# Patient Record
Sex: FEMALE | Race: OTHER | NOT HISPANIC OR LATINO | URBAN - METROPOLITAN AREA
[De-identification: names, ages, dates, MRNs, and addresses within clinical notes are randomized per-mention and may not be internally consistent; named-entity substitution may affect disease eponyms.]

---

## 2024-01-10 PROBLEM — Z00.00 ENCOUNTER FOR PREVENTIVE HEALTH EXAMINATION: Status: ACTIVE | Noted: 2024-01-10

## 2024-01-11 ENCOUNTER — OUTPATIENT (OUTPATIENT)
Dept: OUTPATIENT SERVICES | Facility: HOSPITAL | Age: 29
LOS: 1 days | End: 2024-01-11
Payer: MEDICAID

## 2024-01-11 DIAGNOSIS — R13.19 OTHER DYSPHAGIA: ICD-10-CM

## 2024-01-11 DIAGNOSIS — K21.9 GASTRO-ESOPHAGEAL REFLUX DISEASE WITHOUT ESOPHAGITIS: ICD-10-CM

## 2024-01-11 PROCEDURE — 74220 X-RAY XM ESOPHAGUS 1CNTRST: CPT | Mod: 26

## 2024-01-11 PROCEDURE — 74220 X-RAY XM ESOPHAGUS 1CNTRST: CPT

## 2024-01-23 ENCOUNTER — APPOINTMENT (OUTPATIENT)
Dept: RADIOLOGY | Facility: HOSPITAL | Age: 29
End: 2024-01-23

## 2024-05-15 ENCOUNTER — ASOB RESULT (OUTPATIENT)
Age: 29
End: 2024-05-15

## 2024-05-15 ENCOUNTER — APPOINTMENT (OUTPATIENT)
Dept: OBGYN | Facility: CLINIC | Age: 29
End: 2024-05-15
Payer: MEDICAID

## 2024-05-15 VITALS
DIASTOLIC BLOOD PRESSURE: 104 MMHG | WEIGHT: 168 LBS | SYSTOLIC BLOOD PRESSURE: 157 MMHG | BODY MASS INDEX: 26.37 KG/M2 | HEIGHT: 67 IN | HEART RATE: 71 BPM

## 2024-05-15 VITALS — SYSTOLIC BLOOD PRESSURE: 154 MMHG | HEART RATE: 33 BPM | DIASTOLIC BLOOD PRESSURE: 98 MMHG

## 2024-05-15 DIAGNOSIS — R14.0 ABDOMINAL DISTENSION (GASEOUS): ICD-10-CM

## 2024-05-15 DIAGNOSIS — O10.911 UNSPECIFIED PRE-EXISTING HYPERTENSION COMPLICATING PREGNANCY, FIRST TRIMESTER: ICD-10-CM

## 2024-05-15 DIAGNOSIS — B37.31 ACUTE CANDIDIASIS OF VULVA AND VAGINA: ICD-10-CM

## 2024-05-15 DIAGNOSIS — Z34.91 ENCOUNTER FOR SUPERVISION OF NORMAL PREGNANCY, UNSPECIFIED, FIRST TRIMESTER: ICD-10-CM

## 2024-05-15 DIAGNOSIS — R10.9 ABDOMINAL DISTENSION (GASEOUS): ICD-10-CM

## 2024-05-15 PROCEDURE — 99204 OFFICE O/P NEW MOD 45 MIN: CPT

## 2024-05-15 PROCEDURE — 76817 TRANSVAGINAL US OBSTETRIC: CPT

## 2024-05-15 RX ORDER — TERCONAZOLE 8 MG/G
0.8 CREAM VAGINAL
Qty: 1 | Refills: 0 | Status: ACTIVE | COMMUNITY
Start: 2024-05-15 | End: 1900-01-01

## 2024-05-15 RX ORDER — ASPIRIN ENTERIC COATED TABLETS 81 MG 81 MG/1
81 TABLET, DELAYED RELEASE ORAL DAILY
Qty: 30 | Refills: 3 | Status: ACTIVE | COMMUNITY
Start: 2024-05-15 | End: 1900-01-01

## 2024-05-20 LAB
ALBUMIN SERPL ELPH-MCNC: 4.3 G/DL
ALP BLD-CCNC: 64 U/L
ALT SERPL-CCNC: 12 U/L
ANION GAP SERPL CALC-SCNC: 16 MMOL/L
AST SERPL-CCNC: 17 U/L
B19V IGG SER QL IA: 3.45 INDEX
B19V IGG+IGM SER-IMP: NORMAL
B19V IGG+IGM SER-IMP: POSITIVE
B19V IGM FLD-ACNC: 0.15 INDEX
B19V IGM SER-ACNC: NEGATIVE
BACTERIA UR CULT: NORMAL
BILIRUB SERPL-MCNC: 0.2 MG/DL
BUN SERPL-MCNC: 8 MG/DL
C TRACH RRNA SPEC QL NAA+PROBE: NOT DETECTED
CALCIUM SERPL-MCNC: 9.3 MG/DL
CHLORIDE SERPL-SCNC: 99 MMOL/L
CMV IGG SERPL QL: 2.1 U/ML
CMV IGG SERPL-IMP: POSITIVE
CMV IGM SERPL QL: <8 AU/ML
CMV IGM SERPL QL: NEGATIVE
CO2 SERPL-SCNC: 20 MMOL/L
CREAT SERPL-MCNC: 0.57 MG/DL
CREAT SPEC-SCNC: 50 MG/DL
CREAT/PROT UR: 0.1 RATIO
EGFR: 127 ML/MIN/1.73M2
ESTIMATED AVERAGE GLUCOSE: 97 MG/DL
GLUCOSE SERPL-MCNC: 44 MG/DL
HBA1C MFR BLD HPLC: 5 %
LDH SERPL-CCNC: 206 U/L
M TB IFN-G BLD-IMP: NEGATIVE
N GONORRHOEA RRNA SPEC QL NAA+PROBE: NOT DETECTED
POTASSIUM SERPL-SCNC: 4.7 MMOL/L
PROT SERPL-MCNC: 7.3 G/DL
PROT UR-MCNC: 4 MG/DL
QUANTIFERON TB PLUS MITOGEN MINUS NIL: 5.59 IU/ML
QUANTIFERON TB PLUS NIL: 0.04 IU/ML
QUANTIFERON TB PLUS TB1 MINUS NIL: 0 IU/ML
QUANTIFERON TB PLUS TB2 MINUS NIL: 0 IU/ML
SODIUM SERPL-SCNC: 135 MMOL/L
SOURCE AMPLIFICATION: NORMAL
T GONDII AB SER-IMP: NEGATIVE
T GONDII AB SER-IMP: POSITIVE
T GONDII IGG SER QL: 26.2 IU/ML
T GONDII IGM SER QL: <3 AU/ML
TSH SERPL-ACNC: 0.96 UIU/ML
URATE SERPL-MCNC: 3.6 MG/DL

## 2024-05-23 LAB
CREAT 24H UR-MCNC: 1.3 G/24 H
CREAT ?TM UR-MCNC: 55 MG/DL
PROT 24H UR-MRATE: 16 MG/DL
PROT ?TM UR-MCNC: 24 HR
PROT UR-MCNC: 384 MG/24 H
SPECIMEN VOL 24H UR: 2400 ML

## 2024-05-29 ENCOUNTER — ASOB RESULT (OUTPATIENT)
Age: 29
End: 2024-05-29

## 2024-05-29 ENCOUNTER — APPOINTMENT (OUTPATIENT)
Dept: OBGYN | Facility: CLINIC | Age: 29
End: 2024-05-29
Payer: MEDICAID

## 2024-05-29 ENCOUNTER — APPOINTMENT (OUTPATIENT)
Dept: ANTEPARTUM | Facility: CLINIC | Age: 29
End: 2024-05-29
Payer: MEDICAID

## 2024-05-29 VITALS
DIASTOLIC BLOOD PRESSURE: 79 MMHG | SYSTOLIC BLOOD PRESSURE: 127 MMHG | HEART RATE: 73 BPM | WEIGHT: 163 LBS | BODY MASS INDEX: 25.58 KG/M2 | HEIGHT: 67 IN

## 2024-05-29 DIAGNOSIS — O21.9 VOMITING OF PREGNANCY, UNSPECIFIED: ICD-10-CM

## 2024-05-29 PROCEDURE — 76801 OB US < 14 WKS SINGLE FETUS: CPT | Mod: 59

## 2024-05-29 PROCEDURE — 99203 OFFICE O/P NEW LOW 30 MIN: CPT | Mod: TH

## 2024-05-29 PROCEDURE — 76813 OB US NUCHAL MEAS 1 GEST: CPT

## 2024-05-29 RX ORDER — DOXYLAMINE SUCCINATE AND PYRIDOXINE HYDROCHLORIDE 10; 10 MG/1; MG/1
10-10 TABLET, DELAYED RELEASE ORAL
Qty: 30 | Refills: 2 | Status: ACTIVE | COMMUNITY
Start: 2024-05-29 | End: 1900-01-01

## 2024-06-10 ENCOUNTER — EMERGENCY (EMERGENCY)
Facility: HOSPITAL | Age: 29
LOS: 1 days | Discharge: HOME | End: 2024-06-10
Attending: EMERGENCY MEDICINE | Admitting: EMERGENCY MEDICINE
Payer: MEDICAID

## 2024-06-10 VITALS
SYSTOLIC BLOOD PRESSURE: 136 MMHG | TEMPERATURE: 98 F | HEART RATE: 87 BPM | DIASTOLIC BLOOD PRESSURE: 87 MMHG | WEIGHT: 162.92 LBS | RESPIRATION RATE: 18 BRPM | OXYGEN SATURATION: 100 %

## 2024-06-10 VITALS
TEMPERATURE: 98 F | OXYGEN SATURATION: 100 % | HEART RATE: 77 BPM | RESPIRATION RATE: 18 BRPM | DIASTOLIC BLOOD PRESSURE: 86 MMHG | SYSTOLIC BLOOD PRESSURE: 125 MMHG

## 2024-06-10 LAB
ALBUMIN SERPL ELPH-MCNC: 4.1 G/DL — SIGNIFICANT CHANGE UP (ref 3.3–5)
ALP SERPL-CCNC: 59 U/L — SIGNIFICANT CHANGE UP (ref 40–120)
ALT FLD-CCNC: 9 U/L — SIGNIFICANT CHANGE UP (ref 4–33)
ANION GAP SERPL CALC-SCNC: 12 MMOL/L — SIGNIFICANT CHANGE UP (ref 7–14)
APPEARANCE UR: CLEAR — SIGNIFICANT CHANGE UP
AST SERPL-CCNC: 14 U/L — SIGNIFICANT CHANGE UP (ref 4–32)
BASOPHILS # BLD AUTO: 0.06 K/UL — SIGNIFICANT CHANGE UP (ref 0–0.2)
BASOPHILS NFR BLD AUTO: 0.6 % — SIGNIFICANT CHANGE UP (ref 0–2)
BILIRUB SERPL-MCNC: <0.2 MG/DL — SIGNIFICANT CHANGE UP (ref 0.2–1.2)
BILIRUB UR-MCNC: NEGATIVE — SIGNIFICANT CHANGE UP
BLD GP AB SCN SERPL QL: NEGATIVE — SIGNIFICANT CHANGE UP
BUN SERPL-MCNC: 8 MG/DL — SIGNIFICANT CHANGE UP (ref 7–23)
CALCIUM SERPL-MCNC: 9.7 MG/DL — SIGNIFICANT CHANGE UP (ref 8.4–10.5)
CHLORIDE SERPL-SCNC: 103 MMOL/L — SIGNIFICANT CHANGE UP (ref 98–107)
CO2 SERPL-SCNC: 21 MMOL/L — LOW (ref 22–31)
COLOR SPEC: YELLOW — SIGNIFICANT CHANGE UP
CREAT SERPL-MCNC: 0.51 MG/DL — SIGNIFICANT CHANGE UP (ref 0.5–1.3)
DIFF PNL FLD: NEGATIVE — SIGNIFICANT CHANGE UP
EGFR: 130 ML/MIN/1.73M2 — SIGNIFICANT CHANGE UP
EOSINOPHIL # BLD AUTO: 0.08 K/UL — SIGNIFICANT CHANGE UP (ref 0–0.5)
EOSINOPHIL NFR BLD AUTO: 0.8 % — SIGNIFICANT CHANGE UP (ref 0–6)
GLUCOSE SERPL-MCNC: 85 MG/DL — SIGNIFICANT CHANGE UP (ref 70–99)
GLUCOSE UR QL: NEGATIVE MG/DL — SIGNIFICANT CHANGE UP
HCG SERPL-ACNC: SIGNIFICANT CHANGE UP MIU/ML
HCT VFR BLD CALC: 36.7 % — SIGNIFICANT CHANGE UP (ref 34.5–45)
HGB BLD-MCNC: 12.4 G/DL — SIGNIFICANT CHANGE UP (ref 11.5–15.5)
IANC: 7.23 K/UL — SIGNIFICANT CHANGE UP (ref 1.8–7.4)
IMM GRANULOCYTES NFR BLD AUTO: 0.3 % — SIGNIFICANT CHANGE UP (ref 0–0.9)
KETONES UR-MCNC: NEGATIVE MG/DL — SIGNIFICANT CHANGE UP
LEUKOCYTE ESTERASE UR-ACNC: NEGATIVE — SIGNIFICANT CHANGE UP
LYMPHOCYTES # BLD AUTO: 1.64 K/UL — SIGNIFICANT CHANGE UP (ref 1–3.3)
LYMPHOCYTES # BLD AUTO: 17.1 % — SIGNIFICANT CHANGE UP (ref 13–44)
MCHC RBC-ENTMCNC: 28.1 PG — SIGNIFICANT CHANGE UP (ref 27–34)
MCHC RBC-ENTMCNC: 33.8 GM/DL — SIGNIFICANT CHANGE UP (ref 32–36)
MCV RBC AUTO: 83.2 FL — SIGNIFICANT CHANGE UP (ref 80–100)
MONOCYTES # BLD AUTO: 0.53 K/UL — SIGNIFICANT CHANGE UP (ref 0–0.9)
MONOCYTES NFR BLD AUTO: 5.5 % — SIGNIFICANT CHANGE UP (ref 2–14)
NEUTROPHILS # BLD AUTO: 7.23 K/UL — SIGNIFICANT CHANGE UP (ref 1.8–7.4)
NEUTROPHILS NFR BLD AUTO: 75.7 % — SIGNIFICANT CHANGE UP (ref 43–77)
NITRITE UR-MCNC: NEGATIVE — SIGNIFICANT CHANGE UP
NRBC # BLD: 0 /100 WBCS — SIGNIFICANT CHANGE UP (ref 0–0)
NRBC # FLD: 0 K/UL — SIGNIFICANT CHANGE UP (ref 0–0)
PH UR: 7 — SIGNIFICANT CHANGE UP (ref 5–8)
PLATELET # BLD AUTO: 233 K/UL — SIGNIFICANT CHANGE UP (ref 150–400)
POTASSIUM SERPL-MCNC: 4 MMOL/L — SIGNIFICANT CHANGE UP (ref 3.5–5.3)
POTASSIUM SERPL-SCNC: 4 MMOL/L — SIGNIFICANT CHANGE UP (ref 3.5–5.3)
PROT SERPL-MCNC: 7.5 G/DL — SIGNIFICANT CHANGE UP (ref 6–8.3)
PROT UR-MCNC: NEGATIVE MG/DL — SIGNIFICANT CHANGE UP
RBC # BLD: 4.41 M/UL — SIGNIFICANT CHANGE UP (ref 3.8–5.2)
RBC # FLD: 14.6 % — HIGH (ref 10.3–14.5)
RH IG SCN BLD-IMP: NEGATIVE — SIGNIFICANT CHANGE UP
SODIUM SERPL-SCNC: 136 MMOL/L — SIGNIFICANT CHANGE UP (ref 135–145)
SP GR SPEC: 1.01 — SIGNIFICANT CHANGE UP (ref 1–1.03)
UROBILINOGEN FLD QL: 0.2 MG/DL — SIGNIFICANT CHANGE UP (ref 0.2–1)
WBC # BLD: 9.57 K/UL — SIGNIFICANT CHANGE UP (ref 3.8–10.5)
WBC # FLD AUTO: 9.57 K/UL — SIGNIFICANT CHANGE UP (ref 3.8–10.5)

## 2024-06-10 PROCEDURE — 99284 EMERGENCY DEPT VISIT MOD MDM: CPT

## 2024-06-10 PROCEDURE — 76801 OB US < 14 WKS SINGLE FETUS: CPT | Mod: 26

## 2024-06-10 NOTE — ED ADULT NURSE NOTE - OBJECTIVE STATEMENT
30 y/o F AAOx4, ambulatory at baseline pregnant with her second child c/o vaginal spotting and pelvic pain x2 days. Pt denies vaginal bleeding. Endorsing lower back pain. Breathing even and unlabored. No pallor or diachoresis noted at this time. Labs drawn and sent as per MD order.

## 2024-06-10 NOTE — ED PROVIDER NOTE - CLINICAL SUMMARY MEDICAL DECISION MAKING FREE TEXT BOX
To the emergency department with concern for threatened miscarriage.+ IUP negative pregnancy heart rate 160 UA negative Rh+ previous labs has will follow-up with PMD. To the emergency department with concern for threatened miscarriage.+ IUP negative pregnancy heart rate 170 UA negative Rh+ previous labs has will follow-up with PMD.

## 2024-06-10 NOTE — ED PROVIDER NOTE - OBJECTIVE STATEMENT
29-year-old female G2, P0 presents emergency department with vaginal bleeding cramping nausea vomit diarrhea lightheadedness dizziness states bleeding is mild small amount dried blood mild cramping of the left adnexa.

## 2024-06-10 NOTE — ED PROVIDER NOTE - NSFOLLOWUPINSTRUCTIONS_ED_ALL_ED_FT
Threatened Miscarriage  A threatened miscarriage occurs when a woman has vaginal bleeding during the first 20 weeks of pregnancy but the pregnancy has not ended. If vaginal bleeding occurs during this time, the health care provider will do tests to make sure the woman is still pregnant. The woman's condition may be considered a threatened miscarriage if the tests show:  That she is still pregnant.  That the embryo or unborn baby (fetus) inside the uterus is still growing.  A threatened miscarriage does not mean your pregnancy will end, but it does increase the risk of losing your pregnancy (miscarriage).    What are the causes?  The cause of this condition is usually not known.    What increases the risk?  The following factors may make a pregnant woman more likely to have a miscarriage:    Certain medical conditions    Conditions that affect the hormone balance in the body, such as thyroid disease or polycystic ovary syndrome.  Diabetes.  Autoimmune disorders.  Infections.  Bleeding disorders.  Obesity.  Lifestyle factors    Using products with tobacco or nicotine or being exposed to tobacco smoke.  Having alcohol.  Having large amounts of caffeine.  Recreational drug use.  Problems with reproductive organs or structures    Cervical insufficiency. This is when the the lowest part of the uterus (cervix) opens and thins before pregnancy is at term.  Having a condition called Asherman syndrome, which causes scarring in the uterus or causes the uterus to be abnormal in structure.  Fibrous growths, called fibroids, in the uterus.  Congenital abnormalities. These problems are present at birth.  Infection of the cervix or uterus.  Personal or medical history    Injury (trauma).  Having had a miscarriage before.  Being younger than age 18 or older than age 35.  Exposure to harmful substances in the environment. This may include radiation or heavy metals, such as lead.  Using certain medicines.  What are the signs or symptoms?  Symptoms of this condition include:  Vaginal bleeding or spotting, with or without cramps or pain.  Mild pain or cramps in your abdomen.  How is this diagnosed?    You may have tests to check whether you are still pregnant. These tests will be done if you have bleeding, with or without pain, in your abdomen before the 20th week of pregnancy. These tests include:  Ultrasound.  A physical exam.  Measurement of your baby's heart rate.  Lab tests, such as blood tests, urine tests, or swabs for infection.  You may be diagnosed with a threatened miscarriage if:  Ultrasound testing shows that you are still pregnant.  Your baby's heart rate is strong.  A physical exam shows that your cervix is closed.  Blood tests confirm that you are still pregnant.  How is this treated?  No treatments have been shown to prevent a threatened miscarriage from going on to a complete miscarriage. However, the right home care is important.    Follow these instructions at home:  Get plenty of rest.  Do not have sex, douche, or put anything in your vagina, such as tampons, until your health care provider says it is okay.  Do not smoke or use recreational drugs.  Do not drink alcohol.  Avoid caffeine.  Keep all follow-up prenatal visits. This is important.  Contact a health care provider if:  You have light vaginal bleeding or spotting while pregnant.  You have pain or cramping in your abdomen.  You have a fever.  Get help right away if:  Heavy bleeding soaks through 2 large sanitary pads an hour for more than 2 hours.  Blood clots come out of your vagina.  Tissue comes out of your vagina.  You leak fluid, or you have a gush of fluid from your vagina.  You have severe low back pain or cramps in your abdomen.  You have a fever, chills, and severe pain in the abdomen.  Summary  A threatened miscarriage occurs when a woman bleeds from the vagina during the first 20 weeks of pregnancy but the pregnancy has not ended.  The cause of a threatened miscarriage is usually not known.  Symptoms of this condition may include vaginal bleeding and mild pain or cramps in your abdomen.  No treatments have been shown to prevent a threatened miscarriage from going on to a complete miscarriage.  Keep all follow-up prenatal visits. This is important.

## 2024-06-10 NOTE — ED PROVIDER NOTE - PATIENT PORTAL LINK FT
You can access the FollowMyHealth Patient Portal offered by [facility] by registering at the following website: [email or web address]. By joining [facility]’s FollowMyHealth portal, you will also be able to view your health information using other applications (apps) compatible with our system.

## 2024-07-01 ENCOUNTER — ASOB RESULT (OUTPATIENT)
Age: 29
End: 2024-07-01

## 2024-07-01 ENCOUNTER — APPOINTMENT (OUTPATIENT)
Dept: OBGYN | Facility: CLINIC | Age: 29
End: 2024-07-01
Payer: MEDICAID

## 2024-07-01 DIAGNOSIS — N39.0 URINARY TRACT INFECTION, SITE NOT SPECIFIED: ICD-10-CM

## 2024-07-01 DIAGNOSIS — O36.8120 DECREASED FETAL MOVEMENTS, SECOND TRIMESTER, NOT APPLICABLE OR UNSPECIFIED: ICD-10-CM

## 2024-07-01 DIAGNOSIS — Z34.92 ENCOUNTER FOR SUPERVISION OF NORMAL PREGNANCY, UNSPECIFIED, SECOND TRIMESTER: ICD-10-CM

## 2024-07-01 PROCEDURE — 99213 OFFICE O/P EST LOW 20 MIN: CPT | Mod: TH,25,95

## 2024-07-01 PROCEDURE — 76817 TRANSVAGINAL US OBSTETRIC: CPT

## 2024-07-02 PROBLEM — N39.0 ACUTE UTI: Status: ACTIVE | Noted: 2024-07-02 | Resolved: 2024-08-01

## 2024-07-10 ENCOUNTER — APPOINTMENT (OUTPATIENT)
Dept: OBGYN | Facility: CLINIC | Age: 29
End: 2024-07-10
Payer: MEDICAID

## 2024-07-10 VITALS
WEIGHT: 168 LBS | BODY MASS INDEX: 26.37 KG/M2 | DIASTOLIC BLOOD PRESSURE: 80 MMHG | HEART RATE: 81 BPM | SYSTOLIC BLOOD PRESSURE: 127 MMHG | HEIGHT: 67 IN

## 2024-07-10 DIAGNOSIS — O26.899 OTHER SPECIFIED PREGNANCY RELATED CONDITIONS, UNSPECIFIED TRIMESTER: ICD-10-CM

## 2024-07-10 DIAGNOSIS — R06.02 OTHER SPECIFIED PREGNANCY RELATED CONDITIONS, UNSPECIFIED TRIMESTER: ICD-10-CM

## 2024-07-10 PROCEDURE — 99213 OFFICE O/P EST LOW 20 MIN: CPT | Mod: TH

## 2024-07-11 LAB
AF-AFP MOM: 1.09
AFP CONCENTRATION: 39.92 NG/ML
AFP INTERPRETATION: NORMAL
AFP MOM CUT-OFF: 2.5
AFP PERCENTILE: 60.1
AFP SCREENING RESULT: NORMAL
AFTER SCREENING RISK OPEN SPINA BIFIDA: NORMAL
BEFORE SCREENING RISK OPEN SPINA BIFIDA: NORMAL
EXTREME ANALYTE ALERT: NO
GESTATIONAL  AGE: NORMAL
RACE/ETHNICITY: NORMAL

## 2024-07-25 ENCOUNTER — APPOINTMENT (OUTPATIENT)
Dept: ANTEPARTUM | Facility: CLINIC | Age: 29
End: 2024-07-25
Payer: MEDICAID

## 2024-07-25 ENCOUNTER — ASOB RESULT (OUTPATIENT)
Age: 29
End: 2024-07-25

## 2024-07-25 PROCEDURE — 76805 OB US >/= 14 WKS SNGL FETUS: CPT

## 2024-07-31 ENCOUNTER — APPOINTMENT (OUTPATIENT)
Dept: OBGYN | Facility: CLINIC | Age: 29
End: 2024-07-31

## 2024-07-31 ENCOUNTER — NON-APPOINTMENT (OUTPATIENT)
Age: 29
End: 2024-07-31

## 2024-08-09 ENCOUNTER — APPOINTMENT (OUTPATIENT)
Dept: OBGYN | Facility: CLINIC | Age: 29
End: 2024-08-09

## 2024-08-09 ENCOUNTER — NON-APPOINTMENT (OUTPATIENT)
Age: 29
End: 2024-08-09

## 2024-08-09 PROCEDURE — 99213 OFFICE O/P EST LOW 20 MIN: CPT | Mod: TH

## 2024-09-04 RX ORDER — PSYLLIUM HUSK 0.4 G
CAPSULE ORAL
Refills: 0 | Status: ACTIVE | COMMUNITY

## 2024-09-04 RX ORDER — PRENATAL VIT 49/IRON FUM/FOLIC 6.75-0.2MG
TABLET ORAL
Refills: 0 | Status: ACTIVE | COMMUNITY

## 2024-09-05 ENCOUNTER — NON-APPOINTMENT (OUTPATIENT)
Age: 29
End: 2024-09-05

## 2024-09-05 ENCOUNTER — APPOINTMENT (OUTPATIENT)
Dept: OBGYN | Facility: CLINIC | Age: 29
End: 2024-09-05
Payer: MEDICAID

## 2024-09-05 VITALS
HEART RATE: 72 BPM | BODY MASS INDEX: 28.09 KG/M2 | DIASTOLIC BLOOD PRESSURE: 82 MMHG | HEIGHT: 67 IN | SYSTOLIC BLOOD PRESSURE: 131 MMHG | WEIGHT: 179 LBS

## 2024-09-05 PROCEDURE — 99213 OFFICE O/P EST LOW 20 MIN: CPT | Mod: TH,25

## 2024-09-05 PROCEDURE — 99459 PELVIC EXAMINATION: CPT

## 2024-09-06 LAB
GLUCOSE 1H P 50 G GLC PO SERPL-MCNC: 91 MG/DL
HCT VFR BLD CALC: 34.7 %
HGB BLD-MCNC: 10.7 G/DL
MCHC RBC-ENTMCNC: 27.1 PG
MCHC RBC-ENTMCNC: 30.8 GM/DL
MCV RBC AUTO: 87.8 FL
PLATELET # BLD AUTO: 227 K/UL
RBC # BLD: 3.95 M/UL
RBC # FLD: 15.3 %
WBC # FLD AUTO: 12.24 K/UL

## 2024-09-09 ENCOUNTER — NON-APPOINTMENT (OUTPATIENT)
Age: 29
End: 2024-09-09

## 2024-09-09 ENCOUNTER — APPOINTMENT (OUTPATIENT)
Dept: CARDIOLOGY | Facility: CLINIC | Age: 29
End: 2024-09-09
Payer: MEDICAID

## 2024-09-09 VITALS
RESPIRATION RATE: 16 BRPM | WEIGHT: 176 LBS | OXYGEN SATURATION: 100 % | HEART RATE: 82 BPM | BODY MASS INDEX: 27.62 KG/M2 | HEIGHT: 67 IN | SYSTOLIC BLOOD PRESSURE: 110 MMHG | DIASTOLIC BLOOD PRESSURE: 74 MMHG | TEMPERATURE: 98.3 F

## 2024-09-09 DIAGNOSIS — R07.89 OTHER CHEST PAIN: ICD-10-CM

## 2024-09-09 DIAGNOSIS — O26.899 OTHER SPECIFIED PREGNANCY RELATED CONDITIONS, UNSPECIFIED TRIMESTER: ICD-10-CM

## 2024-09-09 DIAGNOSIS — R06.00 DYSPNEA, UNSPECIFIED: ICD-10-CM

## 2024-09-09 DIAGNOSIS — R06.02 OTHER SPECIFIED PREGNANCY RELATED CONDITIONS, UNSPECIFIED TRIMESTER: ICD-10-CM

## 2024-09-09 DIAGNOSIS — M79.669 PAIN IN UNSPECIFIED LOWER LEG: ICD-10-CM

## 2024-09-09 DIAGNOSIS — Z91.89 OTHER SPECIFIED PERSONAL RISK FACTORS, NOT ELSEWHERE CLASSIFIED: ICD-10-CM

## 2024-09-09 PROCEDURE — G2211 COMPLEX E/M VISIT ADD ON: CPT | Mod: NC

## 2024-09-09 PROCEDURE — 93000 ELECTROCARDIOGRAM COMPLETE: CPT

## 2024-09-09 PROCEDURE — 99204 OFFICE O/P NEW MOD 45 MIN: CPT | Mod: 25

## 2024-09-09 NOTE — REVIEW OF SYSTEMS
[SOB] : shortness of breath [Dyspnea on exertion] : dyspnea during exertion [Chest Discomfort] : chest discomfort [Lower Ext Edema] : lower extremity edema [Negative] : Psychiatric
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
## 2024-09-10 NOTE — PHYSICAL EXAM
[Clear Lung Fields] : clear lung fields [Normal] : alert and oriented, normal memory [de-identified] : fatigue [de-identified] : II/IV LUANA [de-identified] : SOBE

## 2024-09-10 NOTE — PHYSICAL EXAM
[Clear Lung Fields] : clear lung fields [Normal] : alert and oriented, normal memory [de-identified] : fatigue [de-identified] : II/IV LUANA [de-identified] : SOBE

## 2024-09-10 NOTE — PHYSICAL EXAM
[Clear Lung Fields] : clear lung fields [Normal] : alert and oriented, normal memory [de-identified] : fatigue [de-identified] : II/IV LUANA [de-identified] : SOBE
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
## 2024-09-13 LAB — NT-PROBNP SERPL-MCNC: <36 PG/ML

## 2024-09-18 ENCOUNTER — APPOINTMENT (OUTPATIENT)
Dept: ANTEPARTUM | Facility: CLINIC | Age: 29
End: 2024-09-18
Payer: MEDICAID

## 2024-09-18 ENCOUNTER — APPOINTMENT (OUTPATIENT)
Dept: OBGYN | Facility: CLINIC | Age: 29
End: 2024-09-18
Payer: MEDICAID

## 2024-09-18 ENCOUNTER — ASOB RESULT (OUTPATIENT)
Age: 29
End: 2024-09-18

## 2024-09-18 VITALS
HEART RATE: 76 BPM | WEIGHT: 180 LBS | DIASTOLIC BLOOD PRESSURE: 78 MMHG | SYSTOLIC BLOOD PRESSURE: 121 MMHG | BODY MASS INDEX: 28.25 KG/M2 | HEIGHT: 67 IN

## 2024-09-18 DIAGNOSIS — R12 HEARTBURN: ICD-10-CM

## 2024-09-18 DIAGNOSIS — O99.012 ANEMIA COMPLICATING PREGNANCY, SECOND TRIMESTER: ICD-10-CM

## 2024-09-18 PROCEDURE — 99213 OFFICE O/P EST LOW 20 MIN: CPT | Mod: TH,25

## 2024-09-18 PROCEDURE — 76816 OB US FOLLOW-UP PER FETUS: CPT

## 2024-09-18 PROCEDURE — 76819 FETAL BIOPHYS PROFIL W/O NST: CPT | Mod: 59

## 2024-09-18 RX ORDER — CHLORHEXIDINE GLUCONATE 4 %
325 (65 FE) LIQUID (ML) TOPICAL
Qty: 30 | Refills: 3 | Status: ACTIVE | COMMUNITY
Start: 2024-09-18 | End: 1900-01-01

## 2024-09-18 RX ORDER — FAMOTIDINE 40 MG/1
40 TABLET, FILM COATED ORAL
Qty: 30 | Refills: 1 | Status: ACTIVE | COMMUNITY
Start: 2024-09-18 | End: 1900-01-01

## 2024-09-19 ENCOUNTER — APPOINTMENT (OUTPATIENT)
Dept: ANTEPARTUM | Facility: CLINIC | Age: 29
End: 2024-09-19

## 2024-09-25 ENCOUNTER — APPOINTMENT (OUTPATIENT)
Dept: OBGYN | Facility: CLINIC | Age: 29
End: 2024-09-25

## 2024-09-26 ENCOUNTER — APPOINTMENT (OUTPATIENT)
Dept: ANTEPARTUM | Facility: CLINIC | Age: 29
End: 2024-09-26

## 2024-10-07 ENCOUNTER — APPOINTMENT (OUTPATIENT)
Dept: ANTEPARTUM | Facility: CLINIC | Age: 29
End: 2024-10-07

## 2024-10-07 ENCOUNTER — OUTPATIENT (OUTPATIENT)
Dept: INPATIENT UNIT | Facility: HOSPITAL | Age: 29
LOS: 1 days | Discharge: HOME | End: 2024-10-07
Payer: MEDICAID

## 2024-10-07 VITALS — DIASTOLIC BLOOD PRESSURE: 67 MMHG | HEART RATE: 81 BPM | SYSTOLIC BLOOD PRESSURE: 104 MMHG

## 2024-10-07 VITALS
HEART RATE: 88 BPM | DIASTOLIC BLOOD PRESSURE: 82 MMHG | TEMPERATURE: 99 F | RESPIRATION RATE: 16 BRPM | SYSTOLIC BLOOD PRESSURE: 146 MMHG

## 2024-10-07 DIAGNOSIS — Z98.890 OTHER SPECIFIED POSTPROCEDURAL STATES: Chronic | ICD-10-CM

## 2024-10-07 DIAGNOSIS — O26.899 OTHER SPECIFIED PREGNANCY RELATED CONDITIONS, UNSPECIFIED TRIMESTER: ICD-10-CM

## 2024-10-07 LAB
APPEARANCE UR: ABNORMAL
BACTERIA # UR AUTO: ABNORMAL /HPF
BILIRUB UR-MCNC: NEGATIVE — SIGNIFICANT CHANGE UP
CAST: 1 /LPF — SIGNIFICANT CHANGE UP (ref 0–4)
COLOR SPEC: YELLOW — SIGNIFICANT CHANGE UP
DIFF PNL FLD: NEGATIVE — SIGNIFICANT CHANGE UP
GLUCOSE UR QL: NEGATIVE MG/DL — SIGNIFICANT CHANGE UP
KETONES UR-MCNC: NEGATIVE MG/DL — SIGNIFICANT CHANGE UP
LEUKOCYTE ESTERASE UR-ACNC: NEGATIVE — SIGNIFICANT CHANGE UP
NITRITE UR-MCNC: NEGATIVE — SIGNIFICANT CHANGE UP
PH UR: 6.5 — SIGNIFICANT CHANGE UP (ref 5–8)
PROT UR-MCNC: SIGNIFICANT CHANGE UP MG/DL
RBC CASTS # UR COMP ASSIST: 0 /HPF — SIGNIFICANT CHANGE UP (ref 0–4)
SP GR SPEC: 1.02 — SIGNIFICANT CHANGE UP (ref 1–1.03)
SQUAMOUS # UR AUTO: 8 /HPF — HIGH (ref 0–5)
UROBILINOGEN FLD QL: 1 MG/DL — SIGNIFICANT CHANGE UP (ref 0.2–1)
WBC UR QL: 2 /HPF — SIGNIFICANT CHANGE UP (ref 0–5)

## 2024-10-07 PROCEDURE — 76817 TRANSVAGINAL US OBSTETRIC: CPT | Mod: 26

## 2024-10-07 PROCEDURE — 76815 OB US LIMITED FETUS(S): CPT | Mod: 26

## 2024-10-07 PROCEDURE — 76819 FETAL BIOPHYS PROFIL W/O NST: CPT | Mod: 26

## 2024-10-07 PROCEDURE — 99221 1ST HOSP IP/OBS SF/LOW 40: CPT

## 2024-10-07 RX ORDER — ASPIRIN 325 MG
1 TABLET ORAL
Refills: 0 | DISCHARGE

## 2024-10-07 RX ORDER — PRENATAL VIT,CAL 76/IRON/FOLIC 29 MG-1 MG
1 TABLET ORAL
Refills: 0 | DISCHARGE

## 2024-10-07 NOTE — OB PROVIDER TRIAGE NOTE - HISTORY OF PRESENT ILLNESS
30 yo , EGA@30 4/7 weeks, presented to D&T with pelvic pain that started since saturday pain 6/10 pt denies taking any medication,  Pt also c/o decrease fetal movement since this AM, pt report that she has not feel the baby move as she usually do. denies vaginal bleeding, leakage of fluid.  Denies constipation, urinary symptoms, sexual activities for the past 2 -2 days.    Prenatal care with Dr. Plasencia  Prenatal course is uncomplicated.       GBS status is negative.    OB hx: primigravida  Med hx:  Surg hx:  GYN hx: denies hx of abnormal papsmear/cysts/fibroids/STDs  Meds:  Allergies:    Social hx: Denies alcohol, tobacco, drug use  Psych hx: denies hx of anxiety/depression; lives with spouse    PHYSICAL EXAM  Vital Signs:        Gen: NAD  Head: NC/AT  Cardio: S1S2+, RRR  Resp: CTABL, no wheezing  Abdomen: Soft, NT/ND, BS+  Extremities: No LE edema bilaterally    NST and BPP done to assess fetal surveillance and results as follows:  NST-->FHR: 135 HR baseline, moderate variability, accelerations present, no decelerations, reactive NST.  [place]: Contractions present, irregular,  saved in ASOB- TAUS   SSE :No pooling noted, nitrazine negative, FERN positive. No active lesions noted internally, externally, on perineum or rectum.  SVE: 0.5/long/-3 28 yo , EGA@30 4/7 weeks, presented to D&T with pelvic pain that started since Saturday pain 6/10 pt denies taking any medication,  Pt also c/o decrease fetal movement since this AM, pt report that she has not feel the baby move as she usually do. Denies vaginal bleeding, leakage of fluid.  Denies constipation, urinary symptoms, sexual activities for the past 2 -2 days.    Prenatal care with Dr. Plasencia  Prenatal course is uncomplicated.   Meds: ASA, PNV  Allergies: NKDA

## 2024-10-07 NOTE — OB RN TRIAGE NOTE - FALL HARM RISK - UNIVERSAL INTERVENTIONS
Bed in lowest position, wheels locked, appropriate side rails in place/Call bell, personal items and telephone in reach/Instruct patient to call for assistance before getting out of bed or chair/Non-slip footwear when patient is out of bed/[place] to call system/Physically safe environment - no spills, clutter or unnecessary equipment/Purposeful Proactive Rounding/Room/bathroom lighting operational, light cord in reach

## 2024-10-07 NOTE — OB PROVIDER TRIAGE NOTE - NSHPLABSRESULTS_GEN_ALL_CORE
Urinalysis Basic - ( 07 Oct 2024 20:35 )    Color: Yellow / Appearance: Cloudy / S.024 / pH: x  Gluc: x / Ketone: Negative mg/dL  / Bili: Negative / Urobili: 1.0 mg/dL   Blood: x / Protein: Trace mg/dL / Nitrite: Negative   Leuk Esterase: Negative / RBC: 0 /HPF / WBC 2 /HPF   Sq Epi: x / Non Sq Epi: 8 /HPF / Bacteria: Few /HPF      plan   urine culture sent

## 2024-10-07 NOTE — OB PROVIDER TRIAGE NOTE - NS_BABIESUTERO_OBGYN_ALL_OB_NU
1 Graft Donor Site Bandage (Optional-Leave Blank If You Don't Want In Note): Steri-strips and a pressure bandage were applied to the donor site.

## 2024-10-07 NOTE — OB PROVIDER TRIAGE NOTE - NS_SONONOTE_OBGYN_ALL_OB_FT
polychndritis is still mildly active in view of elevated ESR. To continue follow up with rheumatologist and take MTX as ordered.   M-mode 148bpm, BPP 8/8

## 2024-10-07 NOTE — OB PROVIDER TRIAGE NOTE - NSOBPROVIDERNOTE_OBGYN_ALL_OB_FT
28 yo , EGA@30 4/7 weeks, R/O  labor, decrease fetal movement  UA/ urine culture   Pt report that she has been feeling movement since here in triage, report feeling movement and sees movement while performing SONO.   2300 Discuss with Dr. Carolyn guzmán for discharge  Pt to keep self very well hydrated  Discharge patient home.  Labor precautions and fetal movements count were reviewed; if not in labor, will follow up with PMD with for the next schedule appointment on Wednesday.  All ordered tests results reviewed and interpreted.  Plan of care was reviewed with patient and family; patient states understanding of the above plan.  Pt Discharged home @ 2310PM

## 2024-10-07 NOTE — OB PROVIDER TRIAGE NOTE - NSHPPHYSICALEXAM_GEN_ALL_CORE
Vital Signs Last 24 Hrs  T(C): 37.1 (07 Oct 2024 20:24), Max: 37.1 (07 Oct 2024 18:39)  T(F): 98.78 (07 Oct 2024 20:24), Max: 98.8 (07 Oct 2024 18:39)  HR: 81 (07 Oct 2024 22:25) (69 - 88)  BP: 104/67 (07 Oct 2024 22:25) (104/67 - 146/82)  BP(mean): --  RR: 16 (07 Oct 2024 20:24) (16 - 16)  SpO2: --    Gen: NAD  Head: NC/AT  Cardio: S1S2+, RRR  Resp: CTABL, no wheezing  Abdomen: Soft, NT/ND, BS+  Extremities: No LE edema bilaterally    NST--FHR: 130 HR baseline, moderate variability, accelerations present, no decelerations, Category 1.  [place]: no Contractions present  Saved in ASOB- TAUS: cephalic presentation, posterior placenta, JULIO 9.35cm, m-mode 148bpm, BPP 8/8  SSE: scant amount of leukorrhea, cervix appear close. no bleeding noted, FFN collected and held  Saved in ASOB- TVUS: cervical length 4.1-4.2cm, no dynamical changes noted.

## 2024-10-09 LAB
CULTURE RESULTS: SIGNIFICANT CHANGE UP
SPECIMEN SOURCE: SIGNIFICANT CHANGE UP

## 2024-10-10 DIAGNOSIS — O36.8130 DECREASED FETAL MOVEMENTS, THIRD TRIMESTER, NOT APPLICABLE OR UNSPECIFIED: ICD-10-CM

## 2024-10-10 DIAGNOSIS — O26.893 OTHER SPECIFIED PREGNANCY RELATED CONDITIONS, THIRD TRIMESTER: ICD-10-CM

## 2024-10-10 DIAGNOSIS — R10.2 PELVIC AND PERINEAL PAIN: ICD-10-CM

## 2024-10-10 DIAGNOSIS — Z3A.30 30 WEEKS GESTATION OF PREGNANCY: ICD-10-CM

## 2024-10-11 ENCOUNTER — NON-APPOINTMENT (OUTPATIENT)
Age: 29
End: 2024-10-11

## 2024-10-11 ENCOUNTER — APPOINTMENT (OUTPATIENT)
Dept: OBGYN | Facility: CLINIC | Age: 29
End: 2024-10-11

## 2024-10-16 ENCOUNTER — APPOINTMENT (OUTPATIENT)
Dept: OBGYN | Facility: CLINIC | Age: 29
End: 2024-10-16

## 2024-10-16 VITALS
WEIGHT: 190 LBS | HEIGHT: 67 IN | HEART RATE: 81 BPM | BODY MASS INDEX: 29.82 KG/M2 | SYSTOLIC BLOOD PRESSURE: 134 MMHG | DIASTOLIC BLOOD PRESSURE: 82 MMHG

## 2024-10-16 PROCEDURE — 99213 OFFICE O/P EST LOW 20 MIN: CPT | Mod: TH,25

## 2024-10-24 ENCOUNTER — APPOINTMENT (OUTPATIENT)
Dept: CV DIAGNOSITCS | Facility: HOSPITAL | Age: 29
End: 2024-10-24

## 2024-10-24 ENCOUNTER — RESULT REVIEW (OUTPATIENT)
Age: 29
End: 2024-10-24

## 2024-10-24 ENCOUNTER — APPOINTMENT (OUTPATIENT)
Dept: CV DIAGNOSTICS | Facility: HOSPITAL | Age: 29
End: 2024-10-24

## 2024-10-30 ENCOUNTER — APPOINTMENT (OUTPATIENT)
Dept: ANTEPARTUM | Facility: CLINIC | Age: 29
End: 2024-10-30
Payer: MEDICAID

## 2024-10-30 ENCOUNTER — APPOINTMENT (OUTPATIENT)
Dept: OBGYN | Facility: CLINIC | Age: 29
End: 2024-10-30

## 2024-10-30 ENCOUNTER — ASOB RESULT (OUTPATIENT)
Age: 29
End: 2024-10-30

## 2024-10-30 VITALS
HEART RATE: 82 BPM | SYSTOLIC BLOOD PRESSURE: 135 MMHG | DIASTOLIC BLOOD PRESSURE: 89 MMHG | HEIGHT: 67 IN | BODY MASS INDEX: 30.61 KG/M2 | WEIGHT: 195 LBS

## 2024-10-30 VITALS — SYSTOLIC BLOOD PRESSURE: 150 MMHG | DIASTOLIC BLOOD PRESSURE: 98 MMHG

## 2024-10-30 DIAGNOSIS — R09.89 OTHER SPECIFIED SYMPTOMS AND SIGNS INVOLVING THE CIRCULATORY AND RESPIRATORY SYSTEMS: ICD-10-CM

## 2024-10-30 PROCEDURE — 76819 FETAL BIOPHYS PROFIL W/O NST: CPT | Mod: 59

## 2024-10-30 PROCEDURE — 99213 OFFICE O/P EST LOW 20 MIN: CPT | Mod: TH

## 2024-10-30 PROCEDURE — 76816 OB US FOLLOW-UP PER FETUS: CPT

## 2024-10-31 LAB
BASOPHILS # BLD AUTO: 0.06 K/UL
BASOPHILS NFR BLD AUTO: 0.5 %
EOSINOPHIL # BLD AUTO: 0.13 K/UL
EOSINOPHIL NFR BLD AUTO: 1.1 %
HCT VFR BLD CALC: 37 %
HGB BLD-MCNC: 11.4 G/DL
IMM GRANULOCYTES NFR BLD AUTO: 0.9 %
LYMPHOCYTES # BLD AUTO: 2.32 K/UL
LYMPHOCYTES NFR BLD AUTO: 19.5 %
MAN DIFF?: NORMAL
MCHC RBC-ENTMCNC: 26 PG
MCHC RBC-ENTMCNC: 30.8 G/DL
MCV RBC AUTO: 84.5 FL
MONOCYTES # BLD AUTO: 0.92 K/UL
MONOCYTES NFR BLD AUTO: 7.8 %
NEUTROPHILS # BLD AUTO: 8.33 K/UL
NEUTROPHILS NFR BLD AUTO: 70.2 %
PLATELET # BLD AUTO: 241 K/UL
RBC # BLD: 4.38 M/UL
RBC # FLD: 14.7 %
WBC # FLD AUTO: 11.87 K/UL

## 2024-11-01 LAB
ALBUMIN SERPL ELPH-MCNC: 3.9 G/DL
ALP BLD-CCNC: 112 U/L
ALT SERPL-CCNC: 13 U/L
ANION GAP SERPL CALC-SCNC: 18 MMOL/L
AST SERPL-CCNC: 17 U/L
BILIRUB SERPL-MCNC: <0.2 MG/DL
BUN SERPL-MCNC: 7 MG/DL
CALCIUM SERPL-MCNC: 9.3 MG/DL
CHLORIDE SERPL-SCNC: 102 MMOL/L
CO2 SERPL-SCNC: 20 MMOL/L
CREAT SERPL-MCNC: 0.6 MG/DL
EGFR: 125 ML/MIN/1.73M2
GLUCOSE SERPL-MCNC: 53 MG/DL
POTASSIUM SERPL-SCNC: 4.5 MMOL/L
PROT SERPL-MCNC: 6.6 G/DL
SODIUM SERPL-SCNC: 140 MMOL/L
URATE SERPL-MCNC: 4.2 MG/DL

## 2024-11-02 ENCOUNTER — ASOB RESULT (OUTPATIENT)
Age: 29
End: 2024-11-02

## 2024-11-02 ENCOUNTER — OUTPATIENT (OUTPATIENT)
Dept: INPATIENT UNIT | Facility: HOSPITAL | Age: 29
LOS: 1 days | Discharge: HOME | End: 2024-11-02
Payer: MEDICAID

## 2024-11-02 ENCOUNTER — APPOINTMENT (OUTPATIENT)
Dept: ANTEPARTUM | Facility: CLINIC | Age: 29
End: 2024-11-02

## 2024-11-02 VITALS
TEMPERATURE: 98 F | SYSTOLIC BLOOD PRESSURE: 115 MMHG | RESPIRATION RATE: 18 BRPM | DIASTOLIC BLOOD PRESSURE: 66 MMHG | HEART RATE: 72 BPM

## 2024-11-02 VITALS
RESPIRATION RATE: 14 BRPM | SYSTOLIC BLOOD PRESSURE: 148 MMHG | TEMPERATURE: 98 F | HEART RATE: 69 BPM | DIASTOLIC BLOOD PRESSURE: 92 MMHG

## 2024-11-02 DIAGNOSIS — Z98.890 OTHER SPECIFIED POSTPROCEDURAL STATES: Chronic | ICD-10-CM

## 2024-11-02 DIAGNOSIS — O26.899 OTHER SPECIFIED PREGNANCY RELATED CONDITIONS, UNSPECIFIED TRIMESTER: ICD-10-CM

## 2024-11-02 LAB
ALBUMIN SERPL ELPH-MCNC: 3.5 G/DL — SIGNIFICANT CHANGE UP (ref 3.3–5)
ALP SERPL-CCNC: 100 U/L — SIGNIFICANT CHANGE UP (ref 40–120)
ALT FLD-CCNC: 13 U/L — SIGNIFICANT CHANGE UP (ref 4–33)
ANION GAP SERPL CALC-SCNC: 12 MMOL/L — SIGNIFICANT CHANGE UP (ref 7–14)
APPEARANCE UR: CLEAR — SIGNIFICANT CHANGE UP
AST SERPL-CCNC: 14 U/L — SIGNIFICANT CHANGE UP (ref 4–32)
BASOPHILS # BLD AUTO: 0.07 K/UL — SIGNIFICANT CHANGE UP (ref 0–0.2)
BASOPHILS NFR BLD AUTO: 0.7 % — SIGNIFICANT CHANGE UP (ref 0–2)
BILIRUB SERPL-MCNC: <0.2 MG/DL — SIGNIFICANT CHANGE UP (ref 0.2–1.2)
BILIRUB UR-MCNC: NEGATIVE — SIGNIFICANT CHANGE UP
BUN SERPL-MCNC: 9 MG/DL — SIGNIFICANT CHANGE UP (ref 7–23)
CALCIUM SERPL-MCNC: 9.1 MG/DL — SIGNIFICANT CHANGE UP (ref 8.4–10.5)
CHLORIDE SERPL-SCNC: 104 MMOL/L — SIGNIFICANT CHANGE UP (ref 98–107)
CO2 SERPL-SCNC: 23 MMOL/L — SIGNIFICANT CHANGE UP (ref 22–31)
COLOR SPEC: YELLOW — SIGNIFICANT CHANGE UP
CREAT ?TM UR-MCNC: 123 MG/DL — SIGNIFICANT CHANGE UP
CREAT SERPL-MCNC: 0.62 MG/DL — SIGNIFICANT CHANGE UP (ref 0.5–1.3)
DIFF PNL FLD: NEGATIVE — SIGNIFICANT CHANGE UP
EGFR: 124 ML/MIN/1.73M2 — SIGNIFICANT CHANGE UP
EGFR: 124 ML/MIN/1.73M2 — SIGNIFICANT CHANGE UP
EOSINOPHIL # BLD AUTO: 0.15 K/UL — SIGNIFICANT CHANGE UP (ref 0–0.5)
EOSINOPHIL NFR BLD AUTO: 1.5 % — SIGNIFICANT CHANGE UP (ref 0–6)
GLUCOSE SERPL-MCNC: 88 MG/DL — SIGNIFICANT CHANGE UP (ref 70–99)
GLUCOSE UR QL: NEGATIVE MG/DL — SIGNIFICANT CHANGE UP
HCT VFR BLD CALC: 34.7 % — SIGNIFICANT CHANGE UP (ref 34.5–45)
HGB BLD-MCNC: 10.5 G/DL — LOW (ref 11.5–15.5)
IANC: 6.97 K/UL — SIGNIFICANT CHANGE UP (ref 1.8–7.4)
IMM GRANULOCYTES NFR BLD AUTO: 0.8 % — SIGNIFICANT CHANGE UP (ref 0–0.9)
KETONES UR-MCNC: NEGATIVE MG/DL — SIGNIFICANT CHANGE UP
LDH SERPL L TO P-CCNC: 164 U/L — SIGNIFICANT CHANGE UP (ref 135–225)
LEUKOCYTE ESTERASE UR-ACNC: NEGATIVE — SIGNIFICANT CHANGE UP
LYMPHOCYTES # BLD AUTO: 2.06 K/UL — SIGNIFICANT CHANGE UP (ref 1–3.3)
LYMPHOCYTES # BLD AUTO: 20.1 % — SIGNIFICANT CHANGE UP (ref 13–44)
MCHC RBC-ENTMCNC: 25 PG — LOW (ref 27–34)
MCHC RBC-ENTMCNC: 30.3 G/DL — LOW (ref 32–36)
MCV RBC AUTO: 82.6 FL — SIGNIFICANT CHANGE UP (ref 80–100)
MONOCYTES # BLD AUTO: 0.92 K/UL — HIGH (ref 0–0.9)
MONOCYTES NFR BLD AUTO: 9 % — SIGNIFICANT CHANGE UP (ref 2–14)
NEUTROPHILS # BLD AUTO: 6.97 K/UL — SIGNIFICANT CHANGE UP (ref 1.8–7.4)
NEUTROPHILS NFR BLD AUTO: 67.9 % — SIGNIFICANT CHANGE UP (ref 43–77)
NITRITE UR-MCNC: NEGATIVE — SIGNIFICANT CHANGE UP
NRBC # BLD AUTO: 0 K/UL — SIGNIFICANT CHANGE UP (ref 0–0)
NRBC # BLD: 0 /100 WBCS — SIGNIFICANT CHANGE UP (ref 0–0)
NRBC # FLD: 0 K/UL — SIGNIFICANT CHANGE UP (ref 0–0)
NRBC BLD-RTO: 0 /100 WBCS — SIGNIFICANT CHANGE UP (ref 0–0)
PH UR: 6.5 — SIGNIFICANT CHANGE UP (ref 5–8)
PLATELET # BLD AUTO: 257 K/UL — SIGNIFICANT CHANGE UP (ref 150–400)
POTASSIUM SERPL-MCNC: 4.5 MMOL/L — SIGNIFICANT CHANGE UP (ref 3.5–5.3)
POTASSIUM SERPL-SCNC: 4.5 MMOL/L — SIGNIFICANT CHANGE UP (ref 3.5–5.3)
PROT ?TM UR-MCNC: 14 MG/DL — SIGNIFICANT CHANGE UP
PROT SERPL-MCNC: 6.5 G/DL — SIGNIFICANT CHANGE UP (ref 6–8.3)
PROT UR-MCNC: NEGATIVE MG/DL — SIGNIFICANT CHANGE UP
PROT/CREAT UR-RTO: 0.1 RATIO — SIGNIFICANT CHANGE UP (ref 0–0.2)
RBC # BLD: 4.2 M/UL — SIGNIFICANT CHANGE UP (ref 3.8–5.2)
RBC # FLD: 14.6 % — HIGH (ref 10.3–14.5)
SODIUM SERPL-SCNC: 139 MMOL/L — SIGNIFICANT CHANGE UP (ref 135–145)
SP GR SPEC: 1.02 — SIGNIFICANT CHANGE UP (ref 1–1.03)
URATE SERPL-MCNC: 4.7 MG/DL — SIGNIFICANT CHANGE UP (ref 2.5–7)
UROBILINOGEN FLD QL: 0.2 MG/DL — SIGNIFICANT CHANGE UP (ref 0.2–1)
WBC # BLD: 10.25 K/UL — SIGNIFICANT CHANGE UP (ref 3.8–10.5)
WBC # FLD AUTO: 10.25 K/UL — SIGNIFICANT CHANGE UP (ref 3.8–10.5)

## 2024-11-02 PROCEDURE — 59025 FETAL NON-STRESS TEST: CPT | Mod: 26

## 2024-11-02 PROCEDURE — 76819 FETAL BIOPHYS PROFIL W/O NST: CPT | Mod: 26

## 2024-11-02 PROCEDURE — 99222 1ST HOSP IP/OBS MODERATE 55: CPT | Mod: 25

## 2024-11-02 PROCEDURE — 76815 OB US LIMITED FETUS(S): CPT | Mod: 26

## 2024-11-02 RX ORDER — METOCLOPRAMIDE HCL 10 MG
10 TABLET ORAL ONCE
Refills: 0 | Status: COMPLETED | OUTPATIENT
Start: 2024-11-02 | End: 2024-11-02

## 2024-11-02 RX ORDER — DIPHENHYDRAMINE HCL 12.5MG/5ML
25 ELIXIR ORAL ONCE
Refills: 0 | Status: COMPLETED | OUTPATIENT
Start: 2024-11-02 | End: 2024-11-02

## 2024-11-02 RX ORDER — ACETAMINOPHEN 500 MG/5ML
1000 LIQUID (ML) ORAL ONCE
Refills: 0 | Status: COMPLETED | OUTPATIENT
Start: 2024-11-02 | End: 2024-11-02

## 2024-11-02 RX ADMIN — Medication 1000 MILLIGRAM(S): at 18:36

## 2024-11-02 RX ADMIN — Medication 10 MILLIGRAM(S): at 20:27

## 2024-11-02 RX ADMIN — Medication 25 MILLIGRAM(S): at 20:27

## 2024-11-05 ENCOUNTER — NON-APPOINTMENT (OUTPATIENT)
Age: 29
End: 2024-11-05

## 2024-11-05 ENCOUNTER — APPOINTMENT (OUTPATIENT)
Dept: CARDIOLOGY | Facility: CLINIC | Age: 29
End: 2024-11-05
Payer: MEDICAID

## 2024-11-05 VITALS
HEART RATE: 66 BPM | OXYGEN SATURATION: 100 % | HEIGHT: 67 IN | WEIGHT: 196 LBS | DIASTOLIC BLOOD PRESSURE: 90 MMHG | BODY MASS INDEX: 30.76 KG/M2 | SYSTOLIC BLOOD PRESSURE: 130 MMHG

## 2024-11-05 DIAGNOSIS — I10 ESSENTIAL (PRIMARY) HYPERTENSION: ICD-10-CM

## 2024-11-05 PROBLEM — Z86.79 PERSONAL HISTORY OF OTHER DISEASES OF THE CIRCULATORY SYSTEM: Chronic | Status: ACTIVE | Noted: 2024-11-02

## 2024-11-05 LAB
CREAT 24H UR-MCNC: 1.4 G/24 H
CREAT ?TM UR-MCNC: 51 MG/DL
PROT 24H UR-MRATE: 14 MG/DL
PROT ?TM UR-MCNC: 24 HR
PROT UR-MCNC: 382 MG/24 H
SPECIMEN VOL 24H UR: 2725 ML

## 2024-11-05 PROCEDURE — 93000 ELECTROCARDIOGRAM COMPLETE: CPT

## 2024-11-05 PROCEDURE — 99214 OFFICE O/P EST MOD 30 MIN: CPT | Mod: 25

## 2024-11-05 RX ORDER — NIFEDIPINE 30 MG/1
30 TABLET, EXTENDED RELEASE ORAL DAILY
Qty: 30 | Refills: 0 | Status: ACTIVE | COMMUNITY
Start: 2024-11-05 | End: 1900-01-01

## 2024-11-06 ENCOUNTER — NON-APPOINTMENT (OUTPATIENT)
Age: 29
End: 2024-11-06

## 2024-11-06 ENCOUNTER — APPOINTMENT (OUTPATIENT)
Dept: OBGYN | Facility: CLINIC | Age: 29
End: 2024-11-06
Payer: MEDICAID

## 2024-11-06 VITALS
DIASTOLIC BLOOD PRESSURE: 80 MMHG | WEIGHT: 196 LBS | HEART RATE: 93 BPM | BODY MASS INDEX: 30.76 KG/M2 | SYSTOLIC BLOOD PRESSURE: 128 MMHG | HEIGHT: 67 IN

## 2024-11-06 PROCEDURE — 99213 OFFICE O/P EST LOW 20 MIN: CPT | Mod: TH

## 2024-11-07 DIAGNOSIS — O09.293 SUPERVISION OF PREGNANCY WITH OTHER POOR REPRODUCTIVE OR OBSTETRIC HISTORY, THIRD TRIMESTER: ICD-10-CM

## 2024-11-07 DIAGNOSIS — O10.913 UNSPECIFIED PRE-EXISTING HYPERTENSION COMPLICATING PREGNANCY, THIRD TRIMESTER: ICD-10-CM

## 2024-11-07 DIAGNOSIS — Z3A.34 34 WEEKS GESTATION OF PREGNANCY: ICD-10-CM

## 2024-11-11 ENCOUNTER — APPOINTMENT (OUTPATIENT)
Dept: ANTEPARTUM | Facility: CLINIC | Age: 29
End: 2024-11-11
Payer: MEDICAID

## 2024-11-11 ENCOUNTER — ASOB RESULT (OUTPATIENT)
Age: 29
End: 2024-11-11

## 2024-11-11 PROCEDURE — 76818 FETAL BIOPHYS PROFILE W/NST: CPT

## 2024-11-13 ENCOUNTER — APPOINTMENT (OUTPATIENT)
Dept: OBGYN | Facility: CLINIC | Age: 29
End: 2024-11-13
Payer: MEDICAID

## 2024-11-13 VITALS
HEIGHT: 67 IN | BODY MASS INDEX: 31.71 KG/M2 | HEART RATE: 70 BPM | SYSTOLIC BLOOD PRESSURE: 142 MMHG | WEIGHT: 202 LBS | DIASTOLIC BLOOD PRESSURE: 91 MMHG

## 2024-11-13 VITALS — DIASTOLIC BLOOD PRESSURE: 103 MMHG | SYSTOLIC BLOOD PRESSURE: 148 MMHG

## 2024-11-13 DIAGNOSIS — O10.911 UNSPECIFIED PRE-EXISTING HYPERTENSION COMPLICATING PREGNANCY, FIRST TRIMESTER: ICD-10-CM

## 2024-11-13 PROCEDURE — 99213 OFFICE O/P EST LOW 20 MIN: CPT | Mod: TH

## 2024-11-13 RX ORDER — LABETALOL HYDROCHLORIDE 200 MG/1
200 TABLET, FILM COATED ORAL
Qty: 90 | Refills: 1 | Status: ACTIVE | COMMUNITY
Start: 2024-11-13 | End: 1900-01-01

## 2024-11-14 ENCOUNTER — APPOINTMENT (OUTPATIENT)
Dept: ANTEPARTUM | Facility: CLINIC | Age: 29
End: 2024-11-14
Payer: MEDICAID

## 2024-11-14 ENCOUNTER — ASOB RESULT (OUTPATIENT)
Age: 29
End: 2024-11-14

## 2024-11-14 LAB
ALBUMIN SERPL ELPH-MCNC: 3.8 G/DL
ALP BLD-CCNC: 118 U/L
ALT SERPL-CCNC: 13 U/L
ANION GAP SERPL CALC-SCNC: 13 MMOL/L
APTT BLD: 25.9 SEC
AST SERPL-CCNC: 18 U/L
BILIRUB SERPL-MCNC: 0.2 MG/DL
BUN SERPL-MCNC: 7 MG/DL
CALCIUM SERPL-MCNC: 9.5 MG/DL
CHLORIDE SERPL-SCNC: 102 MMOL/L
CO2 SERPL-SCNC: 23 MMOL/L
CREAT SERPL-MCNC: 0.63 MG/DL
EGFR: 124 ML/MIN/1.73M2
GLUCOSE SERPL-MCNC: 55 MG/DL
HCT VFR BLD CALC: 36.2 %
HGB BLD-MCNC: 11 G/DL
INR PPP: 0.86 RATIO
LDH SERPL-CCNC: 218 U/L
MCHC RBC-ENTMCNC: 25.6 PG
MCHC RBC-ENTMCNC: 30.4 G/DL
MCV RBC AUTO: 84.4 FL
PLATELET # BLD AUTO: 257 K/UL
POTASSIUM SERPL-SCNC: 4.3 MMOL/L
PROT SERPL-MCNC: 6.4 G/DL
PT BLD: 10.2 SEC
RBC # BLD: 4.29 M/UL
RBC # FLD: 15.4 %
SODIUM SERPL-SCNC: 139 MMOL/L
URATE SERPL-MCNC: 4.4 MG/DL
WBC # FLD AUTO: 11.8 K/UL

## 2024-11-14 PROCEDURE — 76818 FETAL BIOPHYS PROFILE W/NST: CPT

## 2024-11-15 ENCOUNTER — APPOINTMENT (OUTPATIENT)
Dept: ANTEPARTUM | Facility: CLINIC | Age: 29
End: 2024-11-15
Payer: MEDICAID

## 2024-11-15 ENCOUNTER — ASOB RESULT (OUTPATIENT)
Age: 29
End: 2024-11-15

## 2024-11-15 ENCOUNTER — OUTPATIENT (OUTPATIENT)
Dept: INPATIENT UNIT | Facility: HOSPITAL | Age: 29
LOS: 1 days | Discharge: HOME | End: 2024-11-15
Payer: MEDICAID

## 2024-11-15 VITALS
HEART RATE: 86 BPM | TEMPERATURE: 98 F | DIASTOLIC BLOOD PRESSURE: 85 MMHG | RESPIRATION RATE: 16 BRPM | SYSTOLIC BLOOD PRESSURE: 132 MMHG

## 2024-11-15 VITALS — HEART RATE: 95 BPM | SYSTOLIC BLOOD PRESSURE: 112 MMHG | DIASTOLIC BLOOD PRESSURE: 66 MMHG

## 2024-11-15 DIAGNOSIS — Z98.890 OTHER SPECIFIED POSTPROCEDURAL STATES: Chronic | ICD-10-CM

## 2024-11-15 DIAGNOSIS — O26.899 OTHER SPECIFIED PREGNANCY RELATED CONDITIONS, UNSPECIFIED TRIMESTER: ICD-10-CM

## 2024-11-15 LAB
GP B STREP DNA SPEC QL NAA+PROBE: NOT DETECTED
SOURCE GBS: NORMAL

## 2024-11-15 PROCEDURE — 76815 OB US LIMITED FETUS(S): CPT | Mod: 26

## 2024-11-15 PROCEDURE — 83986 ASSAY PH BODY FLUID NOS: CPT | Mod: QW

## 2024-11-15 PROCEDURE — 59025 FETAL NON-STRESS TEST: CPT | Mod: 26

## 2024-11-15 PROCEDURE — 76819 FETAL BIOPHYS PROFIL W/O NST: CPT | Mod: 26

## 2024-11-15 PROCEDURE — 99221 1ST HOSP IP/OBS SF/LOW 40: CPT | Mod: 25

## 2024-11-15 NOTE — OB PROVIDER TRIAGE NOTE - NSOBPROVIDERNOTE_OBGYN_ALL_OB_FT
29 y.o  @ 36w1d presenting for r/o PPROM.    -Fetal status reassuring  -No evidence of PPROM at this time  -Patient to be discharged home and will follow up with Dr. Plasencia in office at next scheduled appointment.  -Written and verbal discharge instructions and labor precautions provided to patient.    d/w Dr. Miko BRUNO-BC

## 2024-11-15 NOTE — OB PROVIDER TRIAGE NOTE - HISTORY OF PRESENT ILLNESS
29 y.o.  @ 36w1d presents to hospital with c/o leaking of watery fluid this morning and mucous vaginal discharge. She denies gush of fluid, vaginal bleeding, strong contractions, vision changes, epigastric pain, headache, RUQ pain, SOB, chest pain. Endorses +FM.    Prenatal care with Dr. Erinn CANO: Labetalol 200mg TID (last taken at 12pm)

## 2024-11-15 NOTE — OB PROVIDER TRIAGE NOTE - TERM DELIVERIES, OB PROFILE
Diet, NPO after Midnight:      NPO Start Date: 14-Aug-2024,   NPO Start Time: 23:59 (08-14-24 @ 03:39)  
Fall Risk
0

## 2024-11-15 NOTE — OB RN TRIAGE NOTE - NS_GESTAGE_OBGYN_ALL_OB_FT
Although I was initially assigned to Jerry Baker, I did not see, examine, or participate in this patient's care.    Electronically signed by: Tyrese Carlton PA-C, 11/15/2024 9:54 AM         Tyrese Carlton PA-C  11/15/24 0954     36w1d

## 2024-11-15 NOTE — OB PROVIDER TRIAGE NOTE - NSHPPHYSICALEXAM_GEN_ALL_CORE
T(C): 36.9 (11-15-24 @ 14:31), Max: 36.9 (11-15-24 @ 14:31)  HR: 71 (11-15-24 @ 17:09) (68 - 86)  BP: 124/73 (11-15-24 @ 17:09) (124/73 - 132/85)  RR: 16 (11-15-24 @ 14:31) (16 - 16)    Physical Exam  Gen: NAD  Cardiac: RRR  Pulm: Unlabored, breathing comfortably on room air  Abdomen: soft, nontender, gravid    TAUS: cephalic, posterior placenta, JULIO 9.31, BPP 8/8,  bpm via m-mode, images saved in ASOB  SSE: small amount of white discharge, no pooling, nitrazine negative, fern negative  VE: 1/50/-3  EFM: 130 bpm, moderate variability, +accels, no decels, reactive NST  Flat: no contractions

## 2024-11-15 NOTE — OB PROVIDER TRIAGE NOTE - ADDITIONAL INSTRUCTIONS
Follow up with Dr. Plasencia in office within 1 week. Continue to eat a regular diet and drink plenty of fluid. Return to hospital if you experience cramping, contractions, leaking of vaginal fluid, vaginal bleeding, or a decrease/absence of your baby's movements.

## 2024-11-15 NOTE — OB PROVIDER TRIAGE NOTE - NS_DCDISPOSITION_OBGYN_ALL_OB
CC:  Rashid Kaba Home

## 2024-11-18 ENCOUNTER — APPOINTMENT (OUTPATIENT)
Dept: ANTEPARTUM | Facility: CLINIC | Age: 29
End: 2024-11-18

## 2024-11-18 ENCOUNTER — ASOB RESULT (OUTPATIENT)
Age: 29
End: 2024-11-18

## 2024-11-18 ENCOUNTER — APPOINTMENT (OUTPATIENT)
Dept: ANTEPARTUM | Facility: CLINIC | Age: 29
End: 2024-11-18
Payer: MEDICAID

## 2024-11-18 ENCOUNTER — OUTPATIENT (OUTPATIENT)
Dept: INPATIENT UNIT | Facility: HOSPITAL | Age: 29
LOS: 1 days | Discharge: HOME | End: 2024-11-18
Payer: MEDICAID

## 2024-11-18 VITALS
HEART RATE: 74 BPM | TEMPERATURE: 98 F | DIASTOLIC BLOOD PRESSURE: 95 MMHG | SYSTOLIC BLOOD PRESSURE: 131 MMHG | RESPIRATION RATE: 14 BRPM

## 2024-11-18 VITALS — DIASTOLIC BLOOD PRESSURE: 83 MMHG | HEART RATE: 77 BPM | SYSTOLIC BLOOD PRESSURE: 120 MMHG

## 2024-11-18 DIAGNOSIS — Z98.890 OTHER SPECIFIED POSTPROCEDURAL STATES: Chronic | ICD-10-CM

## 2024-11-18 DIAGNOSIS — Z3A.36 36 WEEKS GESTATION OF PREGNANCY: ICD-10-CM

## 2024-11-18 DIAGNOSIS — O10.013 PRE-EXISTING ESSENTIAL HYPERTENSION COMPLICATING PREGNANCY, THIRD TRIMESTER: ICD-10-CM

## 2024-11-18 DIAGNOSIS — O26.899 OTHER SPECIFIED PREGNANCY RELATED CONDITIONS, UNSPECIFIED TRIMESTER: ICD-10-CM

## 2024-11-18 DIAGNOSIS — Z03.71 ENCOUNTER FOR SUSPECTED PROBLEM WITH AMNIOTIC CAVITY AND MEMBRANE RULED OUT: ICD-10-CM

## 2024-11-18 LAB
ALBUMIN SERPL ELPH-MCNC: 3.6 G/DL — SIGNIFICANT CHANGE UP (ref 3.3–5)
ALP SERPL-CCNC: 120 U/L — SIGNIFICANT CHANGE UP (ref 40–120)
ALT FLD-CCNC: 18 U/L — SIGNIFICANT CHANGE UP (ref 4–33)
ANION GAP SERPL CALC-SCNC: 14 MMOL/L — SIGNIFICANT CHANGE UP (ref 7–14)
APPEARANCE UR: CLEAR — SIGNIFICANT CHANGE UP
AST SERPL-CCNC: 29 U/L — SIGNIFICANT CHANGE UP (ref 4–32)
BASOPHILS # BLD AUTO: 0.07 K/UL — SIGNIFICANT CHANGE UP (ref 0–0.2)
BASOPHILS NFR BLD AUTO: 0.7 % — SIGNIFICANT CHANGE UP (ref 0–2)
BILIRUB SERPL-MCNC: 0.2 MG/DL — SIGNIFICANT CHANGE UP (ref 0.2–1.2)
BILIRUB UR-MCNC: NEGATIVE — SIGNIFICANT CHANGE UP
BUN SERPL-MCNC: 9 MG/DL — SIGNIFICANT CHANGE UP (ref 7–23)
CALCIUM SERPL-MCNC: 9 MG/DL — SIGNIFICANT CHANGE UP (ref 8.4–10.5)
CHLORIDE SERPL-SCNC: 103 MMOL/L — SIGNIFICANT CHANGE UP (ref 98–107)
CO2 SERPL-SCNC: 20 MMOL/L — LOW (ref 22–31)
COLOR SPEC: YELLOW — SIGNIFICANT CHANGE UP
CREAT ?TM UR-MCNC: 191 MG/DL — SIGNIFICANT CHANGE UP
CREAT SERPL-MCNC: 0.63 MG/DL — SIGNIFICANT CHANGE UP (ref 0.5–1.3)
DIFF PNL FLD: NEGATIVE — SIGNIFICANT CHANGE UP
EGFR: 124 ML/MIN/1.73M2 — SIGNIFICANT CHANGE UP
EOSINOPHIL # BLD AUTO: 0.13 K/UL — SIGNIFICANT CHANGE UP (ref 0–0.5)
EOSINOPHIL NFR BLD AUTO: 1.2 % — SIGNIFICANT CHANGE UP (ref 0–6)
GLUCOSE SERPL-MCNC: 88 MG/DL — SIGNIFICANT CHANGE UP (ref 70–99)
GLUCOSE UR QL: NEGATIVE MG/DL — SIGNIFICANT CHANGE UP
HCT VFR BLD CALC: 35.8 % — SIGNIFICANT CHANGE UP (ref 34.5–45)
HGB BLD-MCNC: 11 G/DL — LOW (ref 11.5–15.5)
IANC: 7.8 K/UL — HIGH (ref 1.8–7.4)
IMM GRANULOCYTES NFR BLD AUTO: 0.7 % — SIGNIFICANT CHANGE UP (ref 0–0.9)
KETONES UR-MCNC: NEGATIVE MG/DL — SIGNIFICANT CHANGE UP
LDH SERPL L TO P-CCNC: 265 U/L — HIGH (ref 135–225)
LEUKOCYTE ESTERASE UR-ACNC: NEGATIVE — SIGNIFICANT CHANGE UP
LYMPHOCYTES # BLD AUTO: 1.85 K/UL — SIGNIFICANT CHANGE UP (ref 1–3.3)
LYMPHOCYTES # BLD AUTO: 17.3 % — SIGNIFICANT CHANGE UP (ref 13–44)
MCHC RBC-ENTMCNC: 25.3 PG — LOW (ref 27–34)
MCHC RBC-ENTMCNC: 30.7 G/DL — LOW (ref 32–36)
MCV RBC AUTO: 82.5 FL — SIGNIFICANT CHANGE UP (ref 80–100)
MONOCYTES # BLD AUTO: 0.77 K/UL — SIGNIFICANT CHANGE UP (ref 0–0.9)
MONOCYTES NFR BLD AUTO: 7.2 % — SIGNIFICANT CHANGE UP (ref 2–14)
NEUTROPHILS # BLD AUTO: 7.8 K/UL — HIGH (ref 1.8–7.4)
NEUTROPHILS NFR BLD AUTO: 72.9 % — SIGNIFICANT CHANGE UP (ref 43–77)
NITRITE UR-MCNC: NEGATIVE — SIGNIFICANT CHANGE UP
NRBC # BLD: 0 /100 WBCS — SIGNIFICANT CHANGE UP (ref 0–0)
NRBC # FLD: 0 K/UL — SIGNIFICANT CHANGE UP (ref 0–0)
PH UR: 6 — SIGNIFICANT CHANGE UP (ref 5–8)
PLATELET # BLD AUTO: 257 K/UL — SIGNIFICANT CHANGE UP (ref 150–400)
POTASSIUM SERPL-MCNC: 4.5 MMOL/L — SIGNIFICANT CHANGE UP (ref 3.5–5.3)
POTASSIUM SERPL-SCNC: 4.5 MMOL/L — SIGNIFICANT CHANGE UP (ref 3.5–5.3)
PROT ?TM UR-MCNC: 13 MG/DL — SIGNIFICANT CHANGE UP
PROT SERPL-MCNC: 6.8 G/DL — SIGNIFICANT CHANGE UP (ref 6–8.3)
PROT UR-MCNC: SIGNIFICANT CHANGE UP MG/DL
PROT/CREAT UR-RTO: 0.1 RATIO — SIGNIFICANT CHANGE UP (ref 0–0.2)
RBC # BLD: 4.34 M/UL — SIGNIFICANT CHANGE UP (ref 3.8–5.2)
RBC # FLD: 15.2 % — HIGH (ref 10.3–14.5)
SODIUM SERPL-SCNC: 137 MMOL/L — SIGNIFICANT CHANGE UP (ref 135–145)
SP GR SPEC: 1.03 — SIGNIFICANT CHANGE UP (ref 1–1.03)
URATE SERPL-MCNC: 5.5 MG/DL — SIGNIFICANT CHANGE UP (ref 2.5–7)
UROBILINOGEN FLD QL: 0.2 MG/DL — SIGNIFICANT CHANGE UP (ref 0.2–1)
WBC # BLD: 10.69 K/UL — HIGH (ref 3.8–10.5)
WBC # FLD AUTO: 10.69 K/UL — HIGH (ref 3.8–10.5)

## 2024-11-18 PROCEDURE — 76818 FETAL BIOPHYS PROFILE W/NST: CPT

## 2024-11-18 PROCEDURE — 76819 FETAL BIOPHYS PROFIL W/O NST: CPT | Mod: 26,59

## 2024-11-18 PROCEDURE — 99221 1ST HOSP IP/OBS SF/LOW 40: CPT | Mod: 25

## 2024-11-18 PROCEDURE — 59025 FETAL NON-STRESS TEST: CPT | Mod: 26

## 2024-11-18 RX ORDER — SODIUM CHLORIDE 9 MG/ML
3 INJECTION, SOLUTION INTRAMUSCULAR; INTRAVENOUS; SUBCUTANEOUS EVERY 8 HOURS
Refills: 0 | Status: DISCONTINUED | OUTPATIENT
Start: 2024-11-18 | End: 2024-12-02

## 2024-11-18 RX ORDER — LABETALOL 100 MG/1
200 TABLET, FILM COATED ORAL ONCE
Refills: 0 | Status: COMPLETED | OUTPATIENT
Start: 2024-11-18 | End: 2024-11-18

## 2024-11-18 RX ADMIN — LABETALOL 200 MILLIGRAM(S): 100 TABLET, FILM COATED ORAL at 10:31

## 2024-11-18 NOTE — OB PROVIDER TRIAGE NOTE - ADDITIONAL INSTRUCTIONS
PTL precautions d/w pt  increase fluid intake  instructed on fetal kickcounts   follow up with MFKASHIF as sched on 11/21/2024  follow up with dr Plasencia as sched this week   w/v discharge instructions given  discharged home     discharge@ [id number]

## 2024-11-18 NOTE — OB RN TRIAGE NOTE - FALL HARM RISK - UNIVERSAL INTERVENTIONS
Bed in lowest position, wheels locked, appropriate side rails in place/Call bell, personal items and telephone in reach/Instruct patient to call for assistance before getting out of bed or chair/Non-slip footwear when patient is out of bed/[place] to call system/Physically safe environment - no spills, clutter or unnecessary equipment/Purposeful Proactive Rounding/Room/bathroom lighting operational, light cord in reach

## 2024-11-18 NOTE — OB PROVIDER TRIAGE NOTE - NSOBPROVIDERNOTE_OBGYN_ALL_OB_FT
30 y/o  @ 36.4 wks gestation for prolonged monitoring / repeat BPP / PEC labs : 30 y/o  @ 36.4 wks gestation for prolonged monitoring / repeat BPP / PEC labs :  labetalol 200 mg @ 1031  PEC labs - WNL  will continue to monitor 30 y/o  @ 36.4 wks gestation for prolonged monitoring / repeat BPP / PEC labs :  labetalol 200 mg @ 1031  PEC labs - WNL  will continue to monitor    1335  plan of care d/w dr Plasencia   EFM tracing reviewed by DR Plasencia   no evidence of PEC   maternal and fetal status reassuring   pt to be discharged home as per dr Plasencia  discharge home   PTL precautions d/w pt  increase fluid intake  instructed on fetal kickcounts   follow up with MFM as sched on 2024  follow up with dr Plasencia as sched this week   w/v discharge instructions given  discharged home     discharge@ [id number]

## 2024-11-18 NOTE — OB PROVIDER TRIAGE NOTE - NSHPPHYSICALEXAM_GEN_ALL_CORE
abdomen: soft, nt on palp  NST in progress  T(C): 36.9 (11-18-24 @ 09:48), Max: 36.9 (11-18-24 @ 09:48)  HR: 76 (11-18-24 @ 11:15) (74 - 89)  BP: 124/73 (11-18-24 @ 11:15) (124/73 - 141/96)  RR: 14 (11-18-24 @ 09:48) (14 - 14)  SpO2: -- abdomen: soft, nt on palp  NST in progress  T(C): 36.9 (11-18-24 @ 09:48), Max: 36.9 (11-18-24 @ 09:48)  HR: 76 (11-18-24 @ 11:15) (74 - 89)  BP: 124/73 (11-18-24 @ 11:15) (124/73 - 141/96)  RR: 14 (11-18-24 @ 09:48) (14 - 14)  SpO2: --    sono done by Jacqui KAPADIA   BPP; 8/8 vtx posterior JULIO: 10.3   NST reactive abdomen: soft, nt on palp  NST in progress  T(C): 36.9 (11-18-24 @ 09:48), Max: 36.9 (11-18-24 @ 09:48)  HR: 76 (11-18-24 @ 11:15) (74 - 89)  BP: 124/73 (11-18-24 @ 11:15) (124/73 - 141/96)  RR: 14 (11-18-24 @ 09:48) (14 - 14)  SpO2: --    sono done by Jacqui KAPADIA   BPP; 8/8 vtx posterior JULIO: 10.3   NST reactive      T(C): 36.9 (11-18-24 @ 09:48), Max: 36.9 (11-18-24 @ 09:48)  HR: 77 (11-18-24 @ 11:45) (74 - 89)  BP: 120/83 (11-18-24 @ 11:45) (118/73 - 141/96)  RR: 14 (11-18-24 @ 09:48) (14 - 14)  SpO2: -- abdomen: soft, nt on palp  NST in progress  T(C): 36.9 (11-18-24 @ 09:48), Max: 36.9 (11-18-24 @ 09:48)  HR: 76 (11-18-24 @ 11:15) (74 - 89)  BP: 124/73 (11-18-24 @ 11:15) (124/73 - 141/96)  RR: 14 (11-18-24 @ 09:48) (14 - 14)  SpO2: --    sono done by Jacqui KAPADIA   BPP; 8/8 vtx posterior JULIO: 10.3   NST reactive  FH baseline 140 FH variability mod +FH accels no FH decels  toco: no uterine contractions noted      T(C): 36.9 (11-18-24 @ 09:48), Max: 36.9 (11-18-24 @ 09:48)  HR: 77 (11-18-24 @ 11:45) (74 - 89)  BP: 120/83 (11-18-24 @ 11:45) (118/73 - 141/96)  RR: 14 (11-18-24 @ 09:48) (14 - 14)  SpO2: --

## 2024-11-18 NOTE — OB PROVIDER TRIAGE NOTE - HISTORY OF PRESENT ILLNESS
PNC with DR Rosario   30 y/o  @ 36.4 wks gestation presents from [facility] for prolonged monitoring secondary to NST with variables and her BPP was 6/8 , no breathing was noted pt also reported to Dr Mooney she had RUQ pain x's 2 hours last night  and denies any RUQ pain @ this time denies any headache or visual disturbances pt states last took her labetalol 200 mg po TID last night @  denies any uc's vb or lof reports +FM ap care comp by :   cHTN dx ed age 25 followed  by Dr Sanchez , cardiology   Labetalol 200 mg po TID last dose @    baby ASA qd

## 2024-11-18 NOTE — OB PROVIDER TRIAGE NOTE - NSHPLABSRESULTS_GEN_ALL_CORE
PEC labs PEC labs  CBC Full  -  ( 2024 10:22 )  WBC Count : 10.69 K/uL  RBC Count : 4.34 M/uL  Hemoglobin : 11.0 g/dL  Hematocrit : 35.8 %  Platelet Count - Automated : 257 K/uL  Mean Cell Volume : 82.5 fL  Mean Cell Hemoglobin : 25.3 pg  Mean Cell Hemoglobin Concentration : 30.7 g/dL  Auto Neutrophil # : 7.80 K/uL  Auto Lymphocyte # : 1.85 K/uL  Auto Monocyte # : 0.77 K/uL  Auto Eosinophil # : 0.13 K/uL  Auto Basophil # : 0.07 K/uL  Auto Neutrophil % : 72.9 %  Auto Lymphocyte % : 17.3 %  Auto Monocyte % : 7.2 %  Auto Eosinophil % : 1.2 %  Auto Basophil % : 0.7 %        137  |  103  |  9   ----------------------------<  88  4.5   |  20[L]  |  0.63    Ca    9.0      2024 10:22    TPro  6.8  /  Alb  3.6  /  TBili  0.2  /  DBili  x   /  AST  29  /  ALT  18  /  AlkPhos  120  11-18    uric acid: 5.5   LDH: 265    Urinalysis Basic - ( 2024 10:22 )    Color: Yellow / Appearance: Clear / S.026 / pH: x  Gluc: 88 mg/dL / Ketone: Negative mg/dL  / Bili: Negative / Urobili: 0.2 mg/dL   Blood: x / Protein: Trace mg/dL / Nitrite: Negative   Leuk Esterase: Negative / RBC: x / WBC x   Sq Epi: x / Non Sq Epi: x / Bacteria: x    PCR: 0.1

## 2024-11-19 LAB
CREAT 24H UR-MCNC: 1.2 G/24 H
CREAT ?TM UR-MCNC: 48 MG/DL
PROT 24H UR-MRATE: 4 MG/DL
PROT ?TM UR-MCNC: 24 HR
PROT UR-MCNC: 100 MG/24 H
SPECIMEN VOL 24H UR: 2500 ML

## 2024-11-20 ENCOUNTER — APPOINTMENT (OUTPATIENT)
Dept: OBGYN | Facility: CLINIC | Age: 29
End: 2024-11-20

## 2024-11-20 VITALS
HEART RATE: 73 BPM | WEIGHT: 203 LBS | SYSTOLIC BLOOD PRESSURE: 137 MMHG | BODY MASS INDEX: 31.86 KG/M2 | DIASTOLIC BLOOD PRESSURE: 89 MMHG | HEIGHT: 67 IN

## 2024-11-20 VITALS — HEART RATE: 76 BPM | DIASTOLIC BLOOD PRESSURE: 70 MMHG | SYSTOLIC BLOOD PRESSURE: 119 MMHG

## 2024-11-20 DIAGNOSIS — O09.293 SUPERVISION OF PREGNANCY WITH OTHER POOR REPRODUCTIVE OR OBSTETRIC HISTORY, THIRD TRIMESTER: ICD-10-CM

## 2024-11-20 DIAGNOSIS — O36.8330 MATERNAL CARE FOR ABNORMALITIES OF THE FETAL HEART RATE OR RHYTHM, THIRD TRIMESTER, NOT APPLICABLE OR UNSPECIFIED: ICD-10-CM

## 2024-11-20 DIAGNOSIS — O10.013 PRE-EXISTING ESSENTIAL HYPERTENSION COMPLICATING PREGNANCY, THIRD TRIMESTER: ICD-10-CM

## 2024-11-20 DIAGNOSIS — Z3A.36 36 WEEKS GESTATION OF PREGNANCY: ICD-10-CM

## 2024-11-20 PROCEDURE — 99213 OFFICE O/P EST LOW 20 MIN: CPT | Mod: TH,25

## 2024-11-21 ENCOUNTER — ASOB RESULT (OUTPATIENT)
Age: 29
End: 2024-11-21

## 2024-11-21 ENCOUNTER — APPOINTMENT (OUTPATIENT)
Dept: ANTEPARTUM | Facility: CLINIC | Age: 29
End: 2024-11-21
Payer: MEDICAID

## 2024-11-21 PROCEDURE — 76816 OB US FOLLOW-UP PER FETUS: CPT

## 2024-11-21 PROCEDURE — 76818 FETAL BIOPHYS PROFILE W/NST: CPT

## 2024-11-25 ENCOUNTER — INPATIENT (INPATIENT)
Facility: HOSPITAL | Age: 29
LOS: 2 days | Discharge: HOME | End: 2024-11-28
Attending: STUDENT IN AN ORGANIZED HEALTH CARE EDUCATION/TRAINING PROGRAM | Admitting: STUDENT IN AN ORGANIZED HEALTH CARE EDUCATION/TRAINING PROGRAM
Payer: MEDICAID

## 2024-11-25 ENCOUNTER — APPOINTMENT (OUTPATIENT)
Dept: ANTEPARTUM | Facility: CLINIC | Age: 29
End: 2024-11-25

## 2024-11-25 ENCOUNTER — APPOINTMENT (OUTPATIENT)
Dept: ANTEPARTUM | Facility: CLINIC | Age: 29
End: 2024-11-25
Payer: MEDICAID

## 2024-11-25 ENCOUNTER — ASOB RESULT (OUTPATIENT)
Age: 29
End: 2024-11-25

## 2024-11-25 VITALS
SYSTOLIC BLOOD PRESSURE: 136 MMHG | TEMPERATURE: 99 F | RESPIRATION RATE: 20 BRPM | DIASTOLIC BLOOD PRESSURE: 92 MMHG | HEART RATE: 80 BPM

## 2024-11-25 DIAGNOSIS — O11.9 PRE-EXISTING HYPERTENSION WITH PRE-ECLAMPSIA, UNSPECIFIED TRIMESTER: ICD-10-CM

## 2024-11-25 DIAGNOSIS — O26.899 OTHER SPECIFIED PREGNANCY RELATED CONDITIONS, UNSPECIFIED TRIMESTER: ICD-10-CM

## 2024-11-25 DIAGNOSIS — Z98.890 OTHER SPECIFIED POSTPROCEDURAL STATES: Chronic | ICD-10-CM

## 2024-11-25 DIAGNOSIS — Z34.90 ENCOUNTER FOR SUPERVISION OF NORMAL PREGNANCY, UNSPECIFIED, UNSPECIFIED TRIMESTER: ICD-10-CM

## 2024-11-25 LAB
ALBUMIN SERPL ELPH-MCNC: 3.7 G/DL — SIGNIFICANT CHANGE UP (ref 3.3–5)
ALP SERPL-CCNC: 121 U/L — HIGH (ref 40–120)
ALT FLD-CCNC: 14 U/L — SIGNIFICANT CHANGE UP (ref 4–33)
AMNISURE ROM (RUPTURE OF MEMBRANES): NEGATIVE — SIGNIFICANT CHANGE UP
ANION GAP SERPL CALC-SCNC: 13 MMOL/L — SIGNIFICANT CHANGE UP (ref 7–14)
APPEARANCE UR: CLEAR — SIGNIFICANT CHANGE UP
AST SERPL-CCNC: 18 U/L — SIGNIFICANT CHANGE UP (ref 4–32)
BASOPHILS # BLD AUTO: 0.08 K/UL — SIGNIFICANT CHANGE UP (ref 0–0.2)
BASOPHILS NFR BLD AUTO: 0.8 % — SIGNIFICANT CHANGE UP (ref 0–2)
BILIRUB SERPL-MCNC: <0.2 MG/DL — SIGNIFICANT CHANGE UP (ref 0.2–1.2)
BILIRUB UR-MCNC: NEGATIVE — SIGNIFICANT CHANGE UP
BLD GP AB SCN SERPL QL: NEGATIVE — SIGNIFICANT CHANGE UP
BUN SERPL-MCNC: 10 MG/DL — SIGNIFICANT CHANGE UP (ref 7–23)
CALCIUM SERPL-MCNC: 9.2 MG/DL — SIGNIFICANT CHANGE UP (ref 8.4–10.5)
CHLORIDE SERPL-SCNC: 103 MMOL/L — SIGNIFICANT CHANGE UP (ref 98–107)
CO2 SERPL-SCNC: 21 MMOL/L — LOW (ref 22–31)
COLOR SPEC: YELLOW — SIGNIFICANT CHANGE UP
CREAT ?TM UR-MCNC: 130 MG/DL — SIGNIFICANT CHANGE UP
CREAT SERPL-MCNC: 0.59 MG/DL — SIGNIFICANT CHANGE UP (ref 0.5–1.3)
DIFF PNL FLD: NEGATIVE — SIGNIFICANT CHANGE UP
EGFR: 126 ML/MIN/1.73M2 — SIGNIFICANT CHANGE UP
EOSINOPHIL # BLD AUTO: 0.16 K/UL — SIGNIFICANT CHANGE UP (ref 0–0.5)
EOSINOPHIL NFR BLD AUTO: 1.6 % — SIGNIFICANT CHANGE UP (ref 0–6)
GLUCOSE SERPL-MCNC: 85 MG/DL — SIGNIFICANT CHANGE UP (ref 70–99)
GLUCOSE UR QL: NEGATIVE MG/DL — SIGNIFICANT CHANGE UP
HCT VFR BLD CALC: 36 % — SIGNIFICANT CHANGE UP (ref 34.5–45)
HGB BLD-MCNC: 11 G/DL — LOW (ref 11.5–15.5)
IANC: 7.25 K/UL — SIGNIFICANT CHANGE UP (ref 1.8–7.4)
IMM GRANULOCYTES NFR BLD AUTO: 0.8 % — SIGNIFICANT CHANGE UP (ref 0–0.9)
KETONES UR-MCNC: NEGATIVE MG/DL — SIGNIFICANT CHANGE UP
LDH SERPL L TO P-CCNC: 163 U/L — SIGNIFICANT CHANGE UP (ref 135–225)
LEUKOCYTE ESTERASE UR-ACNC: NEGATIVE — SIGNIFICANT CHANGE UP
LYMPHOCYTES # BLD AUTO: 1.87 K/UL — SIGNIFICANT CHANGE UP (ref 1–3.3)
LYMPHOCYTES # BLD AUTO: 18.6 % — SIGNIFICANT CHANGE UP (ref 13–44)
MAGNESIUM SERPL-MCNC: 4.2 MG/DL — HIGH (ref 1.6–2.6)
MCHC RBC-ENTMCNC: 25 PG — LOW (ref 27–34)
MCHC RBC-ENTMCNC: 30.6 G/DL — LOW (ref 32–36)
MCV RBC AUTO: 81.8 FL — SIGNIFICANT CHANGE UP (ref 80–100)
MONOCYTES # BLD AUTO: 0.62 K/UL — SIGNIFICANT CHANGE UP (ref 0–0.9)
MONOCYTES NFR BLD AUTO: 6.2 % — SIGNIFICANT CHANGE UP (ref 2–14)
NEUTROPHILS # BLD AUTO: 7.25 K/UL — SIGNIFICANT CHANGE UP (ref 1.8–7.4)
NEUTROPHILS NFR BLD AUTO: 72 % — SIGNIFICANT CHANGE UP (ref 43–77)
NITRITE UR-MCNC: NEGATIVE — SIGNIFICANT CHANGE UP
NRBC # BLD: 0 /100 WBCS — SIGNIFICANT CHANGE UP (ref 0–0)
NRBC # FLD: 0.02 K/UL — HIGH (ref 0–0)
PH UR: 6.5 — SIGNIFICANT CHANGE UP (ref 5–8)
PLATELET # BLD AUTO: 230 K/UL — SIGNIFICANT CHANGE UP (ref 150–400)
POTASSIUM SERPL-MCNC: 3.8 MMOL/L — SIGNIFICANT CHANGE UP (ref 3.5–5.3)
POTASSIUM SERPL-SCNC: 3.8 MMOL/L — SIGNIFICANT CHANGE UP (ref 3.5–5.3)
PROT ?TM UR-MCNC: 12 MG/DL — SIGNIFICANT CHANGE UP
PROT SERPL-MCNC: 6.7 G/DL — SIGNIFICANT CHANGE UP (ref 6–8.3)
PROT UR-MCNC: NEGATIVE MG/DL — SIGNIFICANT CHANGE UP
PROT/CREAT UR-RTO: 0.1 RATIO — SIGNIFICANT CHANGE UP (ref 0–0.2)
RBC # BLD: 4.4 M/UL — SIGNIFICANT CHANGE UP (ref 3.8–5.2)
RBC # FLD: 15.3 % — HIGH (ref 10.3–14.5)
RH IG SCN BLD-IMP: NEGATIVE — SIGNIFICANT CHANGE UP
SODIUM SERPL-SCNC: 137 MMOL/L — SIGNIFICANT CHANGE UP (ref 135–145)
SP GR SPEC: 1.02 — SIGNIFICANT CHANGE UP (ref 1–1.03)
URATE SERPL-MCNC: 5.2 MG/DL — SIGNIFICANT CHANGE UP (ref 2.5–7)
UROBILINOGEN FLD QL: 0.2 MG/DL — SIGNIFICANT CHANGE UP (ref 0.2–1)
WBC # BLD: 10.06 K/UL — SIGNIFICANT CHANGE UP (ref 3.8–10.5)
WBC # FLD AUTO: 10.06 K/UL — SIGNIFICANT CHANGE UP (ref 3.8–10.5)

## 2024-11-25 PROCEDURE — 76818 FETAL BIOPHYS PROFILE W/NST: CPT

## 2024-11-25 RX ORDER — LABETALOL 100 MG/1
200 TABLET, FILM COATED ORAL THREE TIMES A DAY
Refills: 0 | Status: DISCONTINUED | OUTPATIENT
Start: 2024-11-25 | End: 2024-11-28

## 2024-11-25 RX ORDER — ACETAMINOPHEN 500MG 500 MG/1
1000 TABLET, COATED ORAL ONCE
Refills: 0 | Status: COMPLETED | OUTPATIENT
Start: 2024-11-25 | End: 2024-11-25

## 2024-11-25 RX ORDER — CHLORHEXIDINE GLUCONATE 1.2 MG/ML
1 RINSE ORAL DAILY
Refills: 0 | Status: DISCONTINUED | OUTPATIENT
Start: 2024-11-25 | End: 2024-11-27

## 2024-11-25 RX ORDER — 0.9 % SODIUM CHLORIDE 0.9 %
1000 INTRAVENOUS SOLUTION INTRAVENOUS
Refills: 0 | Status: DISCONTINUED | OUTPATIENT
Start: 2024-11-25 | End: 2024-11-27

## 2024-11-25 RX ORDER — SODIUM CHLORIDE 9 MG/ML
3 INJECTION, SOLUTION INTRAMUSCULAR; INTRAVENOUS; SUBCUTANEOUS EVERY 8 HOURS
Refills: 0 | Status: DISCONTINUED | OUTPATIENT
Start: 2024-11-25 | End: 2024-11-28

## 2024-11-25 RX ADMIN — Medication 50 GM/HR: at 14:29

## 2024-11-25 RX ADMIN — Medication 300 GRAM(S): at 14:08

## 2024-11-25 RX ADMIN — Medication 50 GM/HR: at 19:13

## 2024-11-25 RX ADMIN — LABETALOL 200 MILLIGRAM(S): 100 TABLET, FILM COATED ORAL at 16:59

## 2024-11-25 RX ADMIN — CHLORHEXIDINE GLUCONATE 1 APPLICATION(S): 1.2 RINSE ORAL at 14:05

## 2024-11-25 RX ADMIN — ACETAMINOPHEN 500MG 1000 MILLIGRAM(S): 500 TABLET, COATED ORAL at 11:15

## 2024-11-25 RX ADMIN — Medication 50 MILLILITER(S): at 14:05

## 2024-11-25 NOTE — OB PROVIDER H&P - NSHPLABSRESULTS_GEN_ALL_CORE
PEC labs  CBC Full  -  ( 2024 10:50 )  WBC Count : 10.06 K/uL  RBC Count : 4.40 M/uL  Hemoglobin : 11.0 g/dL  Hematocrit : 36.0 %  Platelet Count - Automated : 230 K/uL  Mean Cell Volume : 81.8 fL  Mean Cell Hemoglobin : 25.0 pg  Mean Cell Hemoglobin Concentration : 30.6 g/dL  Auto Neutrophil # : 7.25 K/uL  Auto Lymphocyte # : 1.87 K/uL  Auto Monocyte # : 0.62 K/uL  Auto Eosinophil # : 0.16 K/uL  Auto Basophil # : 0.08 K/uL  Auto Neutrophil % : 72.0 %  Auto Lymphocyte % : 18.6 %  Auto Monocyte % : 6.2 %  Auto Eosinophil % : 1.6 %  Auto Basophil % : 0.8 %      137  |  103  |  10  ----------------------------<  85  3.8   |  21[L]  |  0.59    Ca    9.2      2024 10:50    TPro  6.7  /  Alb  3.7  /  TBili  <0.2  /  DBili  x   /  AST  18  /  ALT  14  /  AlkPhos  121[H]      uric acid: 5.2  LDH: 163    Urinalysis Basic - ( 2024 10:50 )    Color: Yellow / Appearance: Clear / S.020 / pH: x  Gluc: 85 mg/dL / Ketone: Negative mg/dL  / Bili: Negative / Urobili: 0.2 mg/dL   Blood: x / Protein: Negative mg/dL / Nitrite: Negative   Leuk Esterase: Negative / RBC: x / WBC x   Sq Epi: x / Non Sq Epi: x / Bacteria: x    PCR: 0.1 PEC labs  CBC Full  -  ( 2024 10:50 )  WBC Count : 10.06 K/uL  RBC Count : 4.40 M/uL  Hemoglobin : 11.0 g/dL  Hematocrit : 36.0 %  Platelet Count - Automated : 230 K/uL  Mean Cell Volume : 81.8 fL  Mean Cell Hemoglobin : 25.0 pg  Mean Cell Hemoglobin Concentration : 30.6 g/dL  Auto Neutrophil # : 7.25 K/uL  Auto Lymphocyte # : 1.87 K/uL  Auto Monocyte # : 0.62 K/uL  Auto Eosinophil # : 0.16 K/uL  Auto Basophil # : 0.08 K/uL  Auto Neutrophil % : 72.0 %  Auto Lymphocyte % : 18.6 %  Auto Monocyte % : 6.2 %  Auto Eosinophil % : 1.6 %  Auto Basophil % : 0.8 %      137  |  103  |  10  ----------------------------<  85  3.8   |  21[L]  |  0.59    Ca    9.2      2024 10:50    TPro  6.7  /  Alb  3.7  /  TBili  <0.2  /  DBili  x   /  AST  18  /  ALT  14  /  AlkPhos  121[H]      uric acid: 5.2  LDH: 163    Urinalysis Basic - ( 2024 10:50 )    Color: Yellow / Appearance: Clear / S.020 / pH: x  Gluc: 85 mg/dL / Ketone: Negative mg/dL  / Bili: Negative / Urobili: 0.2 mg/dL   Blood: x / Protein: Negative mg/dL / Nitrite: Negative   Leuk Esterase: Negative / RBC: x / WBC x   Sq Epi: x / Non Sq Epi: x / Bacteria: x    PCR: 0.1    amni sure negative

## 2024-11-25 NOTE — OB PROVIDER H&P - NSCORESITESY/N_GEN_A_CORE_RD
----- Message from Cari Rankin, Patient Care Assistant sent at 8/2/2022  8:08 AM CDT -----  Regarding: advice  Contact: temitope carmona's wife  Type: Needs Medical Advice  Who Called: temitope carmona's wife  Symptoms (please be specific):  haven't urinated today   How long has patient had these symptoms:  1 day   Best Call Back Number: [phone]     Additional Information: please call temitope carmona's wife to advise. Thanks!        
Spoke with patient he reports he is able to urinate and did pass something in his urine this morning , reports no pain at this time. Patient advised to call for any change in condition, patient expressed understanding./   
No

## 2024-11-25 NOTE — OB PROVIDER TRIAGE NOTE - NSHPPHYSICALEXAM_GEN_ALL_CORE
abdomen: soft, nt on palp  SSE    T(C): --  HR: 83 (11-25-24 @ 10:47) (80 - 83)  BP: 140/93 (11-25-24 @ 10:47) (136/92 - 140/93)  RR: --  SpO2: -- abdomen: soft, nt on palp  SSE: no evidence of pooling   fern - negative  nitrazine - negative   amni sure sent   SVE closed/ 60/-3  T(C): --  HR: 83 (11-25-24 @ 10:47) (80 - 83)  BP: 140/93 (11-25-24 @ 10:47) (136/92 - 140/93)  RR: --  SpO2: --    addendum @ 1125 :  T(C): --  HR: 72 (11-25-24 @ 11:18) (72 - 83)  BP: 123/70 (11-25-24 @ 11:18) (123/70 - 140/93)  RR: --  SpO2: --  NST in progress abdomen: soft, nt on palp  SSE: no evidence of pooling   fern - negative  nitrazine - negative   amni sure sent   SVE closed/ 60/-3  T(C): --  HR: 83 (11-25-24 @ 10:47) (80 - 83)  BP: 140/93 (11-25-24 @ 10:47) (136/92 - 140/93)  RR: --  SpO2: --    addendum @ 1125 :  T(C): --  HR: 72 (11-25-24 @ 11:18) (72 - 83)  BP: 123/70 (11-25-24 @ 11:18) (123/70 - 140/93)  RR: --  SpO2: --  NST in progress    NST reactive   FH baseline 130 FH variability mod +FH accels no FH decels   toco: irregular   TAS vertex to confirm presentation   EFW: 2664 grams

## 2024-11-25 NOTE — OB PROVIDER TRIAGE NOTE - NSOBPROVIDERNOTE_OBGYN_ALL_OB_FT
Tylenol 1000 mg po x's 1   [facility]  BP monitoring   will continue to monitor Tylenol 1000 mg po x's 1   PEC  labs  BP monitoring   will continue to monitor    1215:  pt states headache relieved with Tylenol but reports blurred vision has not resolved     1220 plan of care d/w dr Plasencia   pt to be admitted to l&D   [facility] @ 37.6 wks gestation cHTN with si PEC with severe features for buccal cytotec/ Magnesium Sulfate   see admission orders

## 2024-11-25 NOTE — OB PROVIDER H&P - NSHPPHYSICALEXAM_GEN_ALL_CORE
abdomen: soft, nt on palp  SSE: no evidence of pooling   fern - negative  nitrazine - negative   amni sure sent   SVE closed/ 60/-3  T(C): --  HR: 83 (11-25-24 @ 10:47) (80 - 83)  BP: 140/93 (11-25-24 @ 10:47) (136/92 - 140/93)  RR: --  SpO2: --    addendum @ 1125 :  T(C): --  HR: 72 (11-25-24 @ 11:18) (72 - 83)  BP: 123/70 (11-25-24 @ 11:18) (123/70 - 140/93)  RR: --  SpO2: --  NST in progress    NST reactive   FH baseline 130 FH variability mod +FH accels no FH decels   toco: irregular   TAS vertex to confirm presentation   EFW: 2664 grams

## 2024-11-25 NOTE — OB PROVIDER H&P - HISTORY OF PRESENT ILLNESS
PNC with Dr Plasencia   30 y/o  @ 37.6 wks gestation presents from [facility] with c/o ? LOF x's 2 days states felt wetness over the weekend, sono JULIO last week was 7 and  today is 6.02  and c/o headache 7/10 on pain scale has not taken Tylenol and also reports having blurred vision denies any right upper epigastric pain denies any uterine contractions or vaginal bleeding reports +FM denies any n/v/d denies any fever or chills ap care comp by :  cHTN - Labetalol 200 mg po TID, last dose @ 930   baby ASA qd   followed by Dr Sanchez , cardiologist

## 2024-11-25 NOTE — OB PROVIDER H&P - NSPRENATALGBS_OBGYN_ALL_OB_GET_DAYS
Problem: Falls - Risk of:  Goal: Will remain free from falls  Description: Will remain free from falls  Outcome: Met This Shift  Goal: Absence of physical injury  Description: Absence of physical injury  Outcome: Met This Shift     Problem: Discharge Planning:  Goal: Discharged to appropriate level of care  Description: Discharged to appropriate level of care  Outcome: Ongoing     Problem: Serum Glucose Level - Abnormal:  Goal: Ability to maintain appropriate glucose levels will improve  Description: Ability to maintain appropriate glucose levels will improve  Outcome: Met This Shift     Problem: Pain:  Goal: Pain level will decrease  Description: Pain level will decrease  Outcome: Met This Shift  Goal: Control of acute pain  Description: Control of acute pain  Outcome: Met This Shift  Goal: Control of chronic pain  Description: Control of chronic pain  Outcome: Met This Shift     Problem: Skin Integrity:  Goal: Will show no infection signs and symptoms  Description: Will show no infection signs and symptoms  Outcome: Met This Shift  Goal: Absence of new skin breakdown  Description: Absence of new skin breakdown  Outcome: Met This Shift 12 14

## 2024-11-25 NOTE — OB PROVIDER H&P - NS_OBGYNHISTORY_OBGYN_ALL_OB_FT
Problem: Oral Intake Inadequate  Goal: Improved Oral Intake  Outcome: Ongoing, Progressing     Problem: Malnutrition  Goal: Improved Nutritional Intake  Outcome: Ongoing, Progressing      OB HX:   ETOP   GYN HX: denies

## 2024-11-25 NOTE — OB PROVIDER TRIAGE NOTE - NSHPLABSRESULTS_GEN_ALL_CORE
PEC labs PEC labs  CBC Full  -  ( 2024 10:50 )  WBC Count : 10.06 K/uL  RBC Count : 4.40 M/uL  Hemoglobin : 11.0 g/dL  Hematocrit : 36.0 %  Platelet Count - Automated : 230 K/uL  Mean Cell Volume : 81.8 fL  Mean Cell Hemoglobin : 25.0 pg  Mean Cell Hemoglobin Concentration : 30.6 g/dL  Auto Neutrophil # : 7.25 K/uL  Auto Lymphocyte # : 1.87 K/uL  Auto Monocyte # : 0.62 K/uL  Auto Eosinophil # : 0.16 K/uL  Auto Basophil # : 0.08 K/uL  Auto Neutrophil % : 72.0 %  Auto Lymphocyte % : 18.6 %  Auto Monocyte % : 6.2 %  Auto Eosinophil % : 1.6 %  Auto Basophil % : 0.8 %      137  |  103  |  10  ----------------------------<  85  3.8   |  21[L]  |  0.59    Ca    9.2      2024 10:50    TPro  6.7  /  Alb  3.7  /  TBili  <0.2  /  DBili  x   /  AST  18  /  ALT  14  /  AlkPhos  121[H]      uric acid: 5.2  LDH: 163    Urinalysis Basic - ( 2024 10:50 )    Color: Yellow / Appearance: Clear / S.020 / pH: x  Gluc: 85 mg/dL / Ketone: Negative mg/dL  / Bili: Negative / Urobili: 0.2 mg/dL   Blood: x / Protein: Negative mg/dL / Nitrite: Negative   Leuk Esterase: Negative / RBC: x / WBC x   Sq Epi: x / Non Sq Epi: x / Bacteria: x    PCR: 0.1

## 2024-11-25 NOTE — OB RN TRIAGE NOTE - FALL HARM RISK - PATIENT NEEDS ASSISTANCE
No assistance needed

## 2024-11-25 NOTE — OB PROVIDER H&P - ASSESSMENT
Tylenol 1000 mg po x's 1   PEC  labs  BP monitoring   will continue to monitor    1215:  pt states headache relieved with Tylenol but reports blurred vision has not resolved     1220 plan of care d/w dr Plasencia   pt to be admitted to l&D   [facility] @ 37.6 wks gestation cHTN with si PEC with severe features for buccal cytotec/ Magnesium Sulfate   see admission orders    Risks, benefits, alternatives, and possible complications have been discussed in detail with the patient in her native language. Pre-admission, admission, and post admission procedures and expectations were discussed in detail. All questions answered, all appropriate hospital consents were signed. Anticipate normal vaginal delivery.   Informed consent was obtained. The following was discussed:   - Induction/augmentation of labor: use of medication and/or cook balloon to begin or enhance labor   - Obstetrical management including internal fetal/contraction monitoring   - Normal vaginal delivery   - Possible  section  Tylenol 1000 mg po x's 1   PEC  labs  BP monitoring   will continue to monitor    1215:  pt states headache relieved with Tylenol but reports blurred vision has not resolved     1220 plan of care d/w dr Plasencia   no evidence of PROM    pt to be admitted to l&D   [facility] @ 37.6 wks gestation cHTN with si PEC with severe features for buccal cytotec/ Magnesium Sulfate   see admission orders    Risks, benefits, alternatives, and possible complications have been discussed in detail with the patient in her native language. Pre-admission, admission, and post admission procedures and expectations were discussed in detail. All questions answered, all appropriate hospital consents were signed. Anticipate normal vaginal delivery.   Informed consent was obtained. The following was discussed:   - Induction/augmentation of labor: use of medication and/or cook balloon to begin or enhance labor   - Obstetrical management including internal fetal/contraction monitoring   - Normal vaginal delivery   - Possible  section

## 2024-11-25 NOTE — OB PROVIDER TRIAGE NOTE - HISTORY OF PRESENT ILLNESS
PNC with Dr Plasencia   28 y/o  @ 37.6 wks gestation presents from [facility] with c/o ? LOF x's 2 days states felt wetness over the weekend and c/o headache 7/10 on pain scale has not taken Tylenol and also reports having blurred vision denies any right upper epigastric pain denies any uterine contractions or vaginal bleeding reports +FM denies any n/v/d denies any fever or chills ap care comp by :  cHTN - Labetalol 200 mg po TID, last dose @ 930   baby ASA qd   followed by Dr Sanchez , cardiologist  PNC with Dr Plasencia   30 y/o  @ 37.6 wks gestation presents from [facility] with c/o ? LOF x's 2 days states felt wetness over the weekend, sono JULIO last week was 7 and  today is 6.02  and c/o headache 7/10 on pain scale has not taken Tylenol and also reports having blurred vision denies any right upper epigastric pain denies any uterine contractions or vaginal bleeding reports +FM denies any n/v/d denies any fever or chills ap care comp by :  cHTN - Labetalol 200 mg po TID, last dose @ 930   baby ASA qd   followed by Dr Sanchez , cardiologist

## 2024-11-25 NOTE — OB RN PATIENT PROFILE - NS_ADMITROM_OBGYN_ALL_OB
No Rotation Flap Text: Given the location of the defect, shape of the defect and the proximity to free margins a rotation flap was deemed most appropriate. Using a sterile surgical marker an appropriate rotation flap was drawn incorporating the defect and designing a rotation flap based posteriorly extending along the helical rim inferiorly and then posteriorly with the SCD marked superiorly. The area thus outlined was incised through dermis with a #15 scalpel blade.  The skin margins were undermined in the subcutaneous plane just above the perichondrium utilizing iris scissors. The dissection proceeded until the flap could be rotated and advanced into the defect without undue tension.

## 2024-11-26 LAB
ALBUMIN SERPL ELPH-MCNC: 3.4 G/DL — SIGNIFICANT CHANGE UP (ref 3.3–5)
ALP SERPL-CCNC: 117 U/L — SIGNIFICANT CHANGE UP (ref 40–120)
ALT FLD-CCNC: 14 U/L — SIGNIFICANT CHANGE UP (ref 4–33)
ANION GAP SERPL CALC-SCNC: 12 MMOL/L — SIGNIFICANT CHANGE UP (ref 7–14)
AST SERPL-CCNC: 17 U/L — SIGNIFICANT CHANGE UP (ref 4–32)
BASOPHILS # BLD AUTO: 0.1 K/UL — SIGNIFICANT CHANGE UP (ref 0–0.2)
BASOPHILS NFR BLD AUTO: 0.9 % — SIGNIFICANT CHANGE UP (ref 0–2)
BILIRUB SERPL-MCNC: 0.2 MG/DL — SIGNIFICANT CHANGE UP (ref 0.2–1.2)
BUN SERPL-MCNC: 8 MG/DL — SIGNIFICANT CHANGE UP (ref 7–23)
CALCIUM SERPL-MCNC: 7.7 MG/DL — LOW (ref 8.4–10.5)
CHLORIDE SERPL-SCNC: 101 MMOL/L — SIGNIFICANT CHANGE UP (ref 98–107)
CO2 SERPL-SCNC: 21 MMOL/L — LOW (ref 22–31)
CREAT SERPL-MCNC: 0.6 MG/DL — SIGNIFICANT CHANGE UP (ref 0.5–1.3)
EGFR: 125 ML/MIN/1.73M2 — SIGNIFICANT CHANGE UP
EOSINOPHIL # BLD AUTO: 0.23 K/UL — SIGNIFICANT CHANGE UP (ref 0–0.5)
EOSINOPHIL NFR BLD AUTO: 2 % — SIGNIFICANT CHANGE UP (ref 0–6)
GLUCOSE SERPL-MCNC: 86 MG/DL — SIGNIFICANT CHANGE UP (ref 70–99)
HCT VFR BLD CALC: 33.3 % — LOW (ref 34.5–45)
HGB BLD-MCNC: 10.4 G/DL — LOW (ref 11.5–15.5)
IANC: 7.95 K/UL — HIGH (ref 1.8–7.4)
IMM GRANULOCYTES NFR BLD AUTO: 0.6 % — SIGNIFICANT CHANGE UP (ref 0–0.9)
LDH SERPL L TO P-CCNC: 168 U/L — SIGNIFICANT CHANGE UP (ref 135–225)
LYMPHOCYTES # BLD AUTO: 19.9 % — SIGNIFICANT CHANGE UP (ref 13–44)
LYMPHOCYTES # BLD AUTO: 2.32 K/UL — SIGNIFICANT CHANGE UP (ref 1–3.3)
MAGNESIUM SERPL-MCNC: 4.7 MG/DL — HIGH (ref 1.6–2.6)
MAGNESIUM SERPL-MCNC: 5.8 MG/DL — HIGH (ref 1.6–2.6)
MAGNESIUM SERPL-MCNC: 5.8 MG/DL — HIGH (ref 1.6–2.6)
MAGNESIUM SERPL-MCNC: 6 MG/DL — HIGH (ref 1.6–2.6)
MCHC RBC-ENTMCNC: 25.1 PG — LOW (ref 27–34)
MCHC RBC-ENTMCNC: 31.2 G/DL — LOW (ref 32–36)
MCV RBC AUTO: 80.4 FL — SIGNIFICANT CHANGE UP (ref 80–100)
MONOCYTES # BLD AUTO: 0.96 K/UL — HIGH (ref 0–0.9)
MONOCYTES NFR BLD AUTO: 8.3 % — SIGNIFICANT CHANGE UP (ref 2–14)
NEUTROPHILS # BLD AUTO: 7.95 K/UL — HIGH (ref 1.8–7.4)
NEUTROPHILS NFR BLD AUTO: 68.3 % — SIGNIFICANT CHANGE UP (ref 43–77)
NRBC # BLD: 0 /100 WBCS — SIGNIFICANT CHANGE UP (ref 0–0)
NRBC # FLD: 0 K/UL — SIGNIFICANT CHANGE UP (ref 0–0)
PLATELET # BLD AUTO: 211 K/UL — SIGNIFICANT CHANGE UP (ref 150–400)
POTASSIUM SERPL-MCNC: 3.7 MMOL/L — SIGNIFICANT CHANGE UP (ref 3.5–5.3)
POTASSIUM SERPL-SCNC: 3.7 MMOL/L — SIGNIFICANT CHANGE UP (ref 3.5–5.3)
PROT SERPL-MCNC: 6.4 G/DL — SIGNIFICANT CHANGE UP (ref 6–8.3)
RBC # BLD: 4.14 M/UL — SIGNIFICANT CHANGE UP (ref 3.8–5.2)
RBC # FLD: 15.5 % — HIGH (ref 10.3–14.5)
RUBV IGG SER-ACNC: 6.02 INDEX — SIGNIFICANT CHANGE UP
RUBV IGG SER-IMP: POSITIVE — SIGNIFICANT CHANGE UP
SODIUM SERPL-SCNC: 134 MMOL/L — LOW (ref 135–145)
T PALLIDUM AB TITR SER: NEGATIVE — SIGNIFICANT CHANGE UP
URATE SERPL-MCNC: 5.4 MG/DL — SIGNIFICANT CHANGE UP (ref 2.5–7)
WBC # BLD: 11.63 K/UL — HIGH (ref 3.8–10.5)
WBC # FLD AUTO: 11.63 K/UL — HIGH (ref 3.8–10.5)

## 2024-11-26 PROCEDURE — 59409 OBSTETRICAL CARE: CPT | Mod: U9,GC

## 2024-11-26 RX ORDER — OXYCODONE HYDROCHLORIDE 30 MG/1
5 TABLET ORAL ONCE
Refills: 0 | Status: DISCONTINUED | OUTPATIENT
Start: 2024-11-26 | End: 2024-11-28

## 2024-11-26 RX ORDER — ACETAMINOPHEN 500MG 500 MG/1
975 TABLET, COATED ORAL
Refills: 0 | Status: DISCONTINUED | OUTPATIENT
Start: 2024-11-26 | End: 2024-11-28

## 2024-11-26 RX ORDER — DIBUCAINE 1 %
1 OINTMENT (GRAM) TOPICAL EVERY 6 HOURS
Refills: 0 | Status: DISCONTINUED | OUTPATIENT
Start: 2024-11-26 | End: 2024-11-28

## 2024-11-26 RX ORDER — PRAMOXINE HYDROCHLORIDE 1 MG/ML
1 LOTION TOPICAL EVERY 4 HOURS
Refills: 0 | Status: DISCONTINUED | OUTPATIENT
Start: 2024-11-26 | End: 2024-11-28

## 2024-11-26 RX ORDER — OXYCODONE HYDROCHLORIDE 30 MG/1
5 TABLET ORAL
Refills: 0 | Status: DISCONTINUED | OUTPATIENT
Start: 2024-11-26 | End: 2024-11-28

## 2024-11-26 RX ORDER — .BETA.-CAROTENE, SODIUM ACETATE, ASCORBIC ACID, CHOLECALCIFEROL, .ALPHA.-TOCOPHEROL ACETATE, DL-, THIAMINE MONONITRATE, RIBOFLAVIN, NIACINAMIDE, PYRIDOXINE HYDROCHLORIDE, FOLIC ACID, CYANOCOBALAMIN, CALCIUM CARBONATE, FERROUS FUMARATE, ZINC OXIDE AND CUPRIC OXIDE 2000; 2000; 120; 400; 22; 1.84; 3; 20; 10; 1; 12; 200; 27; 25; 2 [IU]/1; [IU]/1; MG/1; [IU]/1; MG/1; MG/1; MG/1; MG/1; MG/1; MG/1; UG/1; MG/1; MG/1; MG/1; MG/1
1 TABLET ORAL DAILY
Refills: 0 | Status: DISCONTINUED | OUTPATIENT
Start: 2024-11-26 | End: 2024-11-28

## 2024-11-26 RX ORDER — DIPHENHYDRAMINE HCL 25 MG
25 CAPSULE ORAL EVERY 6 HOURS
Refills: 0 | Status: DISCONTINUED | OUTPATIENT
Start: 2024-11-26 | End: 2024-11-28

## 2024-11-26 RX ORDER — TETANUS TOXOID, REDUCED DIPHTHERIA TOXOID AND ACELLULAR PERTUSSIS VACCINE, ADSORBED 5; 2.5; 8; 8; 2.5 [IU]/.5ML; [IU]/.5ML; UG/.5ML; UG/.5ML; UG/.5ML
0.5 SUSPENSION INTRAMUSCULAR ONCE
Refills: 0 | Status: DISCONTINUED | OUTPATIENT
Start: 2024-11-26 | End: 2024-11-28

## 2024-11-26 RX ORDER — SIMETHICONE 125 MG
80 CAPSULE ORAL EVERY 4 HOURS
Refills: 0 | Status: DISCONTINUED | OUTPATIENT
Start: 2024-11-26 | End: 2024-11-28

## 2024-11-26 RX ORDER — HYDROCORTISONE BUTYRATE 0.1 %
1 CREAM (GRAM) TOPICAL EVERY 6 HOURS
Refills: 0 | Status: DISCONTINUED | OUTPATIENT
Start: 2024-11-26 | End: 2024-11-28

## 2024-11-26 RX ORDER — BENZOCAINE 10 %
1 OINTMENT (GRAM) TOPICAL EVERY 6 HOURS
Refills: 0 | Status: DISCONTINUED | OUTPATIENT
Start: 2024-11-26 | End: 2024-11-28

## 2024-11-26 RX ORDER — ONDANSETRON HYDROCHLORIDE 4 MG/1
4 TABLET, FILM COATED ORAL ONCE
Refills: 0 | Status: COMPLETED | OUTPATIENT
Start: 2024-11-26 | End: 2024-11-26

## 2024-11-26 RX ORDER — WITCH HAZEL 50 G/100ML
1 SOLUTION RECTAL EVERY 4 HOURS
Refills: 0 | Status: DISCONTINUED | OUTPATIENT
Start: 2024-11-26 | End: 2024-11-28

## 2024-11-26 RX ORDER — KETOROLAC TROMETHAMINE 30 MG/ML
30 INJECTION INTRAMUSCULAR; INTRAVENOUS ONCE
Refills: 0 | Status: DISCONTINUED | OUTPATIENT
Start: 2024-11-26 | End: 2024-11-26

## 2024-11-26 RX ORDER — IBUPROFEN 200 MG
600 TABLET ORAL EVERY 6 HOURS
Refills: 0 | Status: COMPLETED | OUTPATIENT
Start: 2024-11-26 | End: 2025-10-25

## 2024-11-26 RX ORDER — SODIUM CHLORIDE 9 MG/ML
3 INJECTION, SOLUTION INTRAMUSCULAR; INTRAVENOUS; SUBCUTANEOUS EVERY 8 HOURS
Refills: 0 | Status: DISCONTINUED | OUTPATIENT
Start: 2024-11-26 | End: 2024-11-28

## 2024-11-26 RX ORDER — LANOLIN 72 %
1 OINTMENT (GRAM) TOPICAL EVERY 6 HOURS
Refills: 0 | Status: DISCONTINUED | OUTPATIENT
Start: 2024-11-26 | End: 2024-11-28

## 2024-11-26 RX ADMIN — LABETALOL 200 MILLIGRAM(S): 100 TABLET, FILM COATED ORAL at 22:42

## 2024-11-26 RX ADMIN — Medication 2 MILLIUNIT(S)/MIN: at 15:11

## 2024-11-26 RX ADMIN — ONDANSETRON HYDROCHLORIDE 4 MILLIGRAM(S): 4 TABLET, FILM COATED ORAL at 03:43

## 2024-11-26 RX ADMIN — LABETALOL 200 MILLIGRAM(S): 100 TABLET, FILM COATED ORAL at 09:38

## 2024-11-26 RX ADMIN — LABETALOL 200 MILLIGRAM(S): 100 TABLET, FILM COATED ORAL at 17:38

## 2024-11-26 RX ADMIN — CHLORHEXIDINE GLUCONATE 1 APPLICATION(S): 1.2 RINSE ORAL at 15:53

## 2024-11-26 RX ADMIN — Medication 50 GM/HR: at 19:20

## 2024-11-26 RX ADMIN — SODIUM CHLORIDE 3 MILLILITER(S): 9 INJECTION, SOLUTION INTRAMUSCULAR; INTRAVENOUS; SUBCUTANEOUS at 13:30

## 2024-11-26 RX ADMIN — SODIUM CHLORIDE 3 MILLILITER(S): 9 INJECTION, SOLUTION INTRAMUSCULAR; INTRAVENOUS; SUBCUTANEOUS at 01:45

## 2024-11-26 RX ADMIN — SODIUM CHLORIDE 3 MILLILITER(S): 9 INJECTION, SOLUTION INTRAMUSCULAR; INTRAVENOUS; SUBCUTANEOUS at 22:33

## 2024-11-26 RX ADMIN — Medication 50 GM/HR: at 07:07

## 2024-11-26 RX ADMIN — Medication 167 MILLIUNIT(S)/MIN: at 23:57

## 2024-11-26 RX ADMIN — LABETALOL 200 MILLIGRAM(S): 100 TABLET, FILM COATED ORAL at 01:31

## 2024-11-26 RX ADMIN — SODIUM CHLORIDE 3 MILLILITER(S): 9 INJECTION, SOLUTION INTRAMUSCULAR; INTRAVENOUS; SUBCUTANEOUS at 08:35

## 2024-11-26 RX ADMIN — .BETA.-CAROTENE, SODIUM ACETATE, ASCORBIC ACID, CHOLECALCIFEROL, .ALPHA.-TOCOPHEROL ACETATE, DL-, THIAMINE MONONITRATE, RIBOFLAVIN, NIACINAMIDE, PYRIDOXINE HYDROCHLORIDE, FOLIC ACID, CYANOCOBALAMIN, CALCIUM CARBONATE, FERROUS FUMARATE, ZINC OXIDE AND CUPRIC OXIDE 1 TABLET(S): 2000; 2000; 120; 400; 22; 1.84; 3; 20; 10; 1; 12; 200; 27; 25; 2 TABLET ORAL at 16:52

## 2024-11-26 NOTE — OB PROVIDER LABOR PROGRESS NOTE - NS_SUBJECTIVE/OBJECTIVE_OBGYN_ALL_OB_FT
Patient seen and examined for placement of cooks cervical balloon. Patient without complaints.  VS  T(C): 36.8 (11-25-24 @ 21:33)  HR: 72 (11-26-24 @ 01:37)  BP: 137/91 (11-26-24 @ 01:31)  RR: 16 (11-25-24 @ 21:33)  SpO2: 99% (11-26-24 @ 01:37)
Patient seen at bedside for cervical exam and AROM
Pt reexamined for constant pressure.

## 2024-11-26 NOTE — CHART NOTE - NSCHARTNOTEFT_GEN_A_CORE
Patient uncomfortable  FHT category 1  VE 3.5/90/-2  cont pitocin
Patient comfortable  cervical balloon came out  VE 2.5/70/-3  cont present management

## 2024-11-26 NOTE — OB RN DELIVERY SUMMARY - NSSELHIDDEN_OBGYN_ALL_OB_FT
[NS_DeliveryAttending1_OBGYN_ALL_OB_FT:[id number]],[NS_DeliveryAssist1_OBGYN_ALL_OB_FT:[id number]],[NS_DeliveryRN_OBGYN_ALL_OB_FT:[id number]]

## 2024-11-26 NOTE — OB PROVIDER LABOR PROGRESS NOTE - ASSESSMENT
@37.5wks siPEC/Mg  Cooks cervical balloon placed without incident  60cc's instilled in both uterine and vaginal balloons  Patient tolerated well  IV pain meds/epidural PRN  Cont cytotec  Cont fetal/maternal monitoring   Will reassess PRN    ANNE Saul NP
29y.o siPEC on Mg    Dr. Arroyo aware. Will continue to monitor.     Katelyn Pierson, PGY-1
29y  at 37w5 IOL for siPEC.     - Continue EFM/ TOCO  - Continue Mg infusion for siPEC and Lab 200 tid   - sp BC, CB   - Pitocin when able   - Epidural in place and effective, TOP off prn    D/w Dr. Tonja Godoy, PGY2

## 2024-11-26 NOTE — OB POSTPARTUM EVENT NOTE - NS_EVENTFINDINGS1_OBGYN_ALL_OB_FT
reviewed with MD Robertson- to give 1am dose of 200mg labetalol at this time   22:42 - 200mg labetalol given early at this time as per MD Robertson.

## 2024-11-26 NOTE — OB POSTPARTUM EVENT NOTE - NS_EVENTSUMMARY1_OBGYN_ALL_OB_FT
pt was IOL for siPEC on mag. pt delviered via  at 21:33. pt still currently still on mag as per orders. bleeding is light-moderate, fundus is firm and at umbilicus. Pt due to void. Lungs are clear bilaterally and DTR are +2. Last mag level sent at 2100 was 6.

## 2024-11-26 NOTE — OB PROVIDER DELIVERY SUMMARY - NSPROVIDERDELIVERYNOTE_OBGYN_ALL_OB_FT
Spontaneous vaginal delivery of liveborn infant from OA position. Head, shoulders, and body delivered easily. Infant passed to mother. Cord was clamped and cut after 30s delayed cord clamping. Infant brought to warmer. Placenta delivered spontaneously, noted to be intact. Fundal massage was given and uterine fundus was found to be firm. Vaginal exam revealed intact cervix, sulci, vaginal walls, and perineum. Periurethral laceration repaired in standard fashion with chromic suture. Excellent hemostasis was noted. Patient stable. Count correct x 2.    Katelyn Pierson, PGY-1  Delivery with Dr. Hawkins

## 2024-11-26 NOTE — OB PROVIDER LABOR PROGRESS NOTE - NS_OBIHIFHRDETAILS_OBGYN_ALL_OB_FT
Baseline 125, mod variability, - accels + variable decels
Baseline 130, mod variability, variable decelerations
125 mod meghan/+accels/-decels

## 2024-11-26 NOTE — OB RN DELIVERY SUMMARY - NS_SEPSISRSKCALC_OBGYN_ALL_OB_FT
EOS calculated successfully. EOS Risk Factor: 0.5/1000 live births ([facility] national incidence); GA=37w5d; Temp=98.78; ROM=8.35; GBS='Negative'; Antibiotics='No antibiotics or any antibiotics < 2 hrs prior to birth'

## 2024-11-27 ENCOUNTER — TRANSCRIPTION ENCOUNTER (OUTPATIENT)
Age: 29
End: 2024-11-27

## 2024-11-27 ENCOUNTER — NON-APPOINTMENT (OUTPATIENT)
Age: 29
End: 2024-11-27

## 2024-11-27 ENCOUNTER — APPOINTMENT (OUTPATIENT)
Dept: ANTEPARTUM | Facility: CLINIC | Age: 29
End: 2024-11-27

## 2024-11-27 ENCOUNTER — APPOINTMENT (OUTPATIENT)
Dept: OBGYN | Facility: CLINIC | Age: 29
End: 2024-11-27

## 2024-11-27 LAB
ALBUMIN SERPL ELPH-MCNC: 3.2 G/DL — LOW (ref 3.3–5)
ALP SERPL-CCNC: 101 U/L — SIGNIFICANT CHANGE UP (ref 40–120)
ALT FLD-CCNC: 18 U/L — SIGNIFICANT CHANGE UP (ref 4–33)
ANION GAP SERPL CALC-SCNC: 12 MMOL/L — SIGNIFICANT CHANGE UP (ref 7–14)
AST SERPL-CCNC: 27 U/L — SIGNIFICANT CHANGE UP (ref 4–32)
BASOPHILS # BLD AUTO: 0.08 K/UL — SIGNIFICANT CHANGE UP (ref 0–0.2)
BASOPHILS NFR BLD AUTO: 0.6 % — SIGNIFICANT CHANGE UP (ref 0–2)
BILIRUB SERPL-MCNC: <0.2 MG/DL — SIGNIFICANT CHANGE UP (ref 0.2–1.2)
BUN SERPL-MCNC: 10 MG/DL — SIGNIFICANT CHANGE UP (ref 7–23)
CALCIUM SERPL-MCNC: 7.2 MG/DL — LOW (ref 8.4–10.5)
CHLORIDE SERPL-SCNC: 104 MMOL/L — SIGNIFICANT CHANGE UP (ref 98–107)
CO2 SERPL-SCNC: 22 MMOL/L — SIGNIFICANT CHANGE UP (ref 22–31)
CREAT SERPL-MCNC: 0.68 MG/DL — SIGNIFICANT CHANGE UP (ref 0.5–1.3)
EGFR: 122 ML/MIN/1.73M2 — SIGNIFICANT CHANGE UP
EOSINOPHIL # BLD AUTO: 0.15 K/UL — SIGNIFICANT CHANGE UP (ref 0–0.5)
EOSINOPHIL NFR BLD AUTO: 1.2 % — SIGNIFICANT CHANGE UP (ref 0–6)
GLUCOSE SERPL-MCNC: 90 MG/DL — SIGNIFICANT CHANGE UP (ref 70–99)
HCT VFR BLD CALC: 29.3 % — LOW (ref 34.5–45)
HGB BLD-MCNC: 9.2 G/DL — LOW (ref 11.5–15.5)
IANC: 9.24 K/UL — HIGH (ref 1.8–7.4)
IMM GRANULOCYTES NFR BLD AUTO: 0.5 % — SIGNIFICANT CHANGE UP (ref 0–0.9)
KLEIHAUER-BETKE CALCULATION: 0 % — SIGNIFICANT CHANGE UP (ref 0–0.2)
LDH SERPL L TO P-CCNC: 249 U/L — HIGH (ref 135–225)
LYMPHOCYTES # BLD AUTO: 17.1 % — SIGNIFICANT CHANGE UP (ref 13–44)
LYMPHOCYTES # BLD AUTO: 2.14 K/UL — SIGNIFICANT CHANGE UP (ref 1–3.3)
MAGNESIUM SERPL-MCNC: 4.7 MG/DL — HIGH (ref 1.6–2.6)
MAGNESIUM SERPL-MCNC: 5 MG/DL — HIGH (ref 1.6–2.6)
MAGNESIUM SERPL-MCNC: 5.3 MG/DL — HIGH (ref 1.6–2.6)
MAGNESIUM SERPL-MCNC: 5.4 MG/DL — HIGH (ref 1.6–2.6)
MCHC RBC-ENTMCNC: 25.5 PG — LOW (ref 27–34)
MCHC RBC-ENTMCNC: 31.4 G/DL — LOW (ref 32–36)
MCV RBC AUTO: 81.2 FL — SIGNIFICANT CHANGE UP (ref 80–100)
MONOCYTES # BLD AUTO: 0.88 K/UL — SIGNIFICANT CHANGE UP (ref 0–0.9)
MONOCYTES NFR BLD AUTO: 7 % — SIGNIFICANT CHANGE UP (ref 2–14)
NEUTROPHILS # BLD AUTO: 9.24 K/UL — HIGH (ref 1.8–7.4)
NEUTROPHILS NFR BLD AUTO: 73.6 % — SIGNIFICANT CHANGE UP (ref 43–77)
NRBC # BLD: 0 /100 WBCS — SIGNIFICANT CHANGE UP (ref 0–0)
NRBC # FLD: 0 K/UL — SIGNIFICANT CHANGE UP (ref 0–0)
PLATELET # BLD AUTO: 232 K/UL — SIGNIFICANT CHANGE UP (ref 150–400)
POTASSIUM SERPL-MCNC: 4.2 MMOL/L — SIGNIFICANT CHANGE UP (ref 3.5–5.3)
POTASSIUM SERPL-SCNC: 4.2 MMOL/L — SIGNIFICANT CHANGE UP (ref 3.5–5.3)
PROT SERPL-MCNC: 6.2 G/DL — SIGNIFICANT CHANGE UP (ref 6–8.3)
RBC # BLD: 3.61 M/UL — LOW (ref 3.8–5.2)
RBC # FLD: 15.7 % — HIGH (ref 10.3–14.5)
SODIUM SERPL-SCNC: 138 MMOL/L — SIGNIFICANT CHANGE UP (ref 135–145)
URATE SERPL-MCNC: 5.9 MG/DL — SIGNIFICANT CHANGE UP (ref 2.5–7)
WBC # BLD: 12.55 K/UL — HIGH (ref 3.8–10.5)
WBC # FLD AUTO: 12.55 K/UL — HIGH (ref 3.8–10.5)

## 2024-11-27 RX ORDER — IBUPROFEN 200 MG
600 TABLET ORAL EVERY 6 HOURS
Refills: 0 | Status: DISCONTINUED | OUTPATIENT
Start: 2024-11-27 | End: 2024-11-28

## 2024-11-27 RX ORDER — LABETALOL 100 MG/1
1 TABLET, FILM COATED ORAL
Refills: 0 | DISCHARGE

## 2024-11-27 RX ORDER — IBUPROFEN 200 MG
1 TABLET ORAL
Qty: 0 | Refills: 0 | DISCHARGE
Start: 2024-11-27

## 2024-11-27 RX ORDER — 0.9 % SODIUM CHLORIDE 0.9 %
500 INTRAVENOUS SOLUTION INTRAVENOUS ONCE
Refills: 0 | Status: COMPLETED | OUTPATIENT
Start: 2024-11-27 | End: 2024-11-27

## 2024-11-27 RX ADMIN — SODIUM CHLORIDE 3 MILLILITER(S): 9 INJECTION, SOLUTION INTRAMUSCULAR; INTRAVENOUS; SUBCUTANEOUS at 06:13

## 2024-11-27 RX ADMIN — Medication 50 GM/HR: at 15:51

## 2024-11-27 RX ADMIN — SODIUM CHLORIDE 3 MILLILITER(S): 9 INJECTION, SOLUTION INTRAMUSCULAR; INTRAVENOUS; SUBCUTANEOUS at 23:27

## 2024-11-27 RX ADMIN — .BETA.-CAROTENE, SODIUM ACETATE, ASCORBIC ACID, CHOLECALCIFEROL, .ALPHA.-TOCOPHEROL ACETATE, DL-, THIAMINE MONONITRATE, RIBOFLAVIN, NIACINAMIDE, PYRIDOXINE HYDROCHLORIDE, FOLIC ACID, CYANOCOBALAMIN, CALCIUM CARBONATE, FERROUS FUMARATE, ZINC OXIDE AND CUPRIC OXIDE 1 TABLET(S): 2000; 2000; 120; 400; 22; 1.84; 3; 20; 10; 1; 12; 200; 27; 25; 2 TABLET ORAL at 16:54

## 2024-11-27 RX ADMIN — SODIUM CHLORIDE 3 MILLILITER(S): 9 INJECTION, SOLUTION INTRAMUSCULAR; INTRAVENOUS; SUBCUTANEOUS at 16:24

## 2024-11-27 RX ADMIN — LABETALOL 200 MILLIGRAM(S): 100 TABLET, FILM COATED ORAL at 15:29

## 2024-11-27 RX ADMIN — ACETAMINOPHEN 500MG 975 MILLIGRAM(S): 500 TABLET, COATED ORAL at 23:45

## 2024-11-27 RX ADMIN — Medication 50 MILLILITER(S): at 03:32

## 2024-11-27 RX ADMIN — ACETAMINOPHEN 500MG 975 MILLIGRAM(S): 500 TABLET, COATED ORAL at 16:50

## 2024-11-27 RX ADMIN — ACETAMINOPHEN 500MG 975 MILLIGRAM(S): 500 TABLET, COATED ORAL at 17:20

## 2024-11-27 RX ADMIN — LABETALOL 200 MILLIGRAM(S): 100 TABLET, FILM COATED ORAL at 06:55

## 2024-11-27 RX ADMIN — LABETALOL 200 MILLIGRAM(S): 100 TABLET, FILM COATED ORAL at 23:43

## 2024-11-27 RX ADMIN — Medication 50 GM/HR: at 19:14

## 2024-11-27 RX ADMIN — Medication 50 GM/HR: at 02:25

## 2024-11-27 RX ADMIN — Medication 1000 MILLILITER(S): at 00:30

## 2024-11-27 NOTE — DISCHARGE NOTE OB - CARE PROVIDER_API CALL
Art Shaver  Obstetrics and Gynecology  [address], [address]  [place] [address]  Phone: [phone]  Fax: [phone]  Follow Up Time:

## 2024-11-27 NOTE — DISCHARGE NOTE OB - MEDICATION SUMMARY - MEDICATIONS TO TAKE
I will START or STAY ON the medications listed below when I get home from the hospital:    ibuprofen 600 mg oral tablet  -- 1 tab(s) by mouth every 6 hours  -- Indication: For Chronic hypertension with superimposed preeclampsia   I will START or STAY ON the medications listed below when I get home from the hospital:    ibuprofen 600 mg oral tablet  -- 1 tab(s) by mouth every 6 hours as needed for  moderate pain  -- Indication: For     acetaminophen 325 mg oral tablet  -- 3 tab(s) by mouth every 6 hours as needed for  mild pain  -- Indication: For     labetalol 200 mg oral tablet  -- 1 tab(s) by mouth 3 times a day  -- Indication: For Chronic hypertension with superimposed preeclampsia    Prenatal Multivitamins with Folic Acid 1 mg oral tablet  -- 1 tab(s) by mouth once a day  -- Indication: For

## 2024-11-27 NOTE — DISCHARGE NOTE OB - ADDITIONAL INSTRUCTIONS
1 week for a Blood Pressure check    Please take your Blood Pressure 3 times per day (Morning, afternoon and evening).  Please call your OB office with readings over 140/90, or if you have a severe headache, Blurry vision, or severe heartburn.. Please go to the ER or Labor and Delivery with readings of 160/100 or higher..  Keep a log of all your Blood Pressure readings and bring it with you to your next doctor's appointment.    Please take your Blood pressure medication as directed.  Please hold the medication  if your blood pressure readings are 110/60 or lower. Call your OB  with any abnormal  readings.

## 2024-11-27 NOTE — LACTATION INITIAL EVALUATION - LACTATION INTERVENTIONS
Mom educated about babies less than 24 hours of age will be sleepy. Made aware of cluster feeding that occurs after 24 hours of life and to be cautious of sleep deprivation in order to maintain infant and mother safety. Instructed to place infant in bassinet or call for assistance if feeling sleepy or tired.Recognition of feeding cues and to feed the baby on demand based on cues at least 8-12 times in a day. Instructed pt. to wake the baby to feed if no feeding cues are seen within 3h since prior feed. Pt. educated on the nutritional needs of the baby, how many wet and dirty diapers to expect, along with the amount of times the baby needs to be placed on the breast at this time.  use  feeding log to record feedings along with wet and dirty diapers. instructed in hand expression with good return demonstration.  Reviewed safe skin to skin. Verbalized understanding of education. Encouraged to breastfeed the baby on demand based on cues and at least 8-12 times in a day. Instructed to log feedings along with wet and dirty diapers./initiate/review safe skin-to-skin/initiate/review hand expression/initiate/review pumping guidelines and safe milk handling/initiate/review techniques for position and latch/post discharge community resources provided/initiate/review supplementation plan due to medical indications/review techniques to increase milk supply/review techniques to manage sore nipples/engorgement/initiate/review finger suck/initiate/review breast massage/compression/initiate/review alternate feeding method/reviewed components of an effective feeding and at least 8 effective feedings per day required/reviewed importance of monitoring infant diapers, the breastfeeding log, and minimum output each day/reviewed risks of unnecessary formula supplementation/reviewed risks of artificial nipples/reviewed strategies to transition to breastfeeding only/reviewed benefits and recommendations for rooming in/reviewed feeding on demand/by cue at least 8 times a day/recommended follow-up with pediatrician within 24 hours of discharge/reviewed indications of inadequate milk transfer that would require supplementation

## 2024-11-27 NOTE — LACTATION INITIAL EVALUATION - INTERVENTION OUTCOME
Recommended more breastfeeding and less formula feeding. Supplementation risks discussed. Strategies reviewed on getting baby on breast more often. Instructed in hand expression with + colostrum noted.  Discussed  prevention  and  treatment of  sore nipples using colostrum care and/or lanolin. Latch Video Shown. Went over New Beginning book. Pt s/p mag very sleepy, Stated will Breastfeed Later. Mother and Infant roomed-in.   Mother educated on safe skin to skin care, safe sleep practices and environment. Call bell within reach. Informed pt about Triple Feeding, Primary nurse to Give Instructions about Pumping. Informed Primary nurse of Current status @ this time.

## 2024-11-27 NOTE — DISCHARGE NOTE OB - PATIENT PORTAL LINK FT
You can access the FollowMyHealth Patient Portal offered by [facility] by registering at the following website: [email or web address]. By joining [facility]’s FollowMyHealth portal, you will also be able to view your health information using other applications (apps) compatible with our system.

## 2024-11-27 NOTE — OB NEONATOLOGY/PEDIATRICIAN DELIVERY SUMMARY - NSPEDSNEONOTESA_OBGYN_ALL_OB_FT
Peds called to LDR for FHT cat, Mag, and terminal meconium. Pt is a 37.4wk AGA male born via IOL  to a y/o  mother.  Mother had IOL SiPEC on Mag (last bolus 14:07 on ) with symptoms of headache and blurry vision.  Maternal medical history of cHTN on labetalol, previously procardia but that caused HA and neck sxs, and surgical history of liposuction. Patient was taking PNV and ASA during pregnancy. Ob history of TOPx1 with D&C. Maternal labs include Blood Type A- got Rhogam , HIV - , RPR NR , Rubella I , Hep B - , GBS - as of . AROM at 13:11 on  with clear fluids (ROM hours: 8H21M).  Baby emerged not vigorous, not crying and was vigorous, crying by 5 minutes of life. He was warmed, dried, suctioned, and stimulated with APGARS of 6/8/9.  Resuscitation included: deep suctioning x3, CPAP from 2MOL to 8MOL. Patient did well after CPAP, stable on RA saturating >90%. Mom plans to initiate breastfeeding, consents Hep B vaccine and consents circ.  Highest maternal temp: 37.1 EOS 0.21.    :   TOB: 21:33  Weight: 2690

## 2024-11-27 NOTE — DISCHARGE NOTE OB - MATERIALS PROVIDED
Vaccinations/[facility]  Screening Program/  Immunization Record/Breastfeeding Log/Bottle Feeding Log/Breastfeeding Mother’s Support Group Information/Guide to Postpartum Care/Back To Sleep Handout/Breastfeeding Guide and Packet/Birth Certificate Instructions/Discharge Medication Information for Patients and Families Pocket Guide/MMR Vaccination (VIS Pub Date: 2012)/Tdap Vaccination (VIS Pub Date: 2012)

## 2024-11-27 NOTE — DISCHARGE NOTE OB - FINANCIAL ASSISTANCE
[facility] provides services at a reduced cost to those who are determined to be eligible through [facility]’s financial assistance program. Information regarding [facility]’s financial assistance program can be found by going to [email or web address] or by calling [phone].

## 2024-11-27 NOTE — DISCHARGE NOTE OB - CARE PLAN
1 Principal Discharge DX:	Term pregnancy delivered  Assessment and plan of treatment:	normal activity, regular diet follow up in office in 6 weeks.

## 2024-11-27 NOTE — LACTATION INITIAL EVALUATION - LATCH
September 10, 2020      Sushma Marie  [address] [place] [address]        Dear Sushma,    I care about your health and have reviewed your health plan. I have reviewed your medical conditions, medication list, and lab results and am making recommendations based on this review, to better manage your health.  You are in particular need of attention regarding:  -Wellness (Physical) Visit     I am recommending that you:  -schedule a WELLNESS (Physical) APPOINTMENT with me.   I will check fasting labs the same day - nothing to eat except water and meds for 8-10 hours prior.    Here is a list of Health Maintenance topics that are due now or due soon:  Health Maintenance Due   Topic Date Due     Hepatitis C Screening  1948     COPD Action Plan  1948     Flu Vaccine (1) 09/01/2020     Zoster (Shingles) Vaccine (3 of 3) 03/31/2020     Annual Wellness Visit  09/09/2020     Please call us at [phone] (or use [employer]) to address the above recommendations.   Thank you for trusting [facility] and we appreciate the opportunity to serve you.  We look forward to supporting your healthcare needs in the future.    Healthy Regards,    Manjit Melgoza MD             Educated & helped with Deeper Latch, Infant had Formula before LC assisting/shallow latch

## 2024-11-27 NOTE — DISCHARGE NOTE OB - NS MD DC FALL RISK RISK
For information on Fall & Injury Prevention, visit: [email or web address] OR  [email or web address] OR  https://www.cdc.gov/steadi/patient.html

## 2024-11-27 NOTE — DISCHARGE NOTE OB - PLAN OF CARE
Detail Level: Generalized
Include Location In Plan?: Yes
normal activity, regular diet follow up in office in 6 weeks.

## 2024-11-28 RX ORDER — ACETAMINOPHEN 500MG 500 MG/1
3 TABLET, COATED ORAL
Qty: 0 | Refills: 0 | DISCHARGE
Start: 2024-11-28

## 2024-11-28 RX ORDER — .BETA.-CAROTENE, SODIUM ACETATE, ASCORBIC ACID, CHOLECALCIFEROL, .ALPHA.-TOCOPHEROL ACETATE, DL-, THIAMINE MONONITRATE, RIBOFLAVIN, NIACINAMIDE, PYRIDOXINE HYDROCHLORIDE, FOLIC ACID, CYANOCOBALAMIN, CALCIUM CARBONATE, FERROUS FUMARATE, ZINC OXIDE AND CUPRIC OXIDE 2000; 2000; 120; 400; 22; 1.84; 3; 20; 10; 1; 12; 200; 27; 25; 2 [IU]/1; [IU]/1; MG/1; [IU]/1; MG/1; MG/1; MG/1; MG/1; MG/1; MG/1; UG/1; MG/1; MG/1; MG/1; MG/1
1 TABLET ORAL
Qty: 0 | Refills: 0 | DISCHARGE
Start: 2024-11-28

## 2024-11-28 RX ORDER — LABETALOL 100 MG/1
1 TABLET, FILM COATED ORAL
Qty: 0 | Refills: 0 | DISCHARGE
Start: 2024-11-28

## 2024-11-28 RX ADMIN — .BETA.-CAROTENE, SODIUM ACETATE, ASCORBIC ACID, CHOLECALCIFEROL, .ALPHA.-TOCOPHEROL ACETATE, DL-, THIAMINE MONONITRATE, RIBOFLAVIN, NIACINAMIDE, PYRIDOXINE HYDROCHLORIDE, FOLIC ACID, CYANOCOBALAMIN, CALCIUM CARBONATE, FERROUS FUMARATE, ZINC OXIDE AND CUPRIC OXIDE 1 TABLET(S): 2000; 2000; 120; 400; 22; 1.84; 3; 20; 10; 1; 12; 200; 27; 25; 2 TABLET ORAL at 13:04

## 2024-11-28 RX ADMIN — ACETAMINOPHEN 500MG 975 MILLIGRAM(S): 500 TABLET, COATED ORAL at 08:43

## 2024-11-28 RX ADMIN — ACETAMINOPHEN 500MG 975 MILLIGRAM(S): 500 TABLET, COATED ORAL at 00:15

## 2024-11-28 RX ADMIN — ACETAMINOPHEN 500MG 975 MILLIGRAM(S): 500 TABLET, COATED ORAL at 09:35

## 2024-11-28 RX ADMIN — SODIUM CHLORIDE 3 MILLILITER(S): 9 INJECTION, SOLUTION INTRAMUSCULAR; INTRAVENOUS; SUBCUTANEOUS at 06:57

## 2024-11-28 RX ADMIN — LABETALOL 200 MILLIGRAM(S): 100 TABLET, FILM COATED ORAL at 08:43

## 2024-11-28 NOTE — PROGRESS NOTE ADULT - ATTENDING COMMENTS
Pt seen and evaluated at bedside  Agree with above  PPD 1 s/p , doing well  BPs at goal on Lab 200 TID  Continue MgSo4 for 24 hours     breanna HOLDER
patient seen and examined at bedside. agree with above assessment and plan

## 2024-11-28 NOTE — PROGRESS NOTE ADULT - SUBJECTIVE AND OBJECTIVE BOX
28yo PPD#2 s/p  c/b siPEC. Patient feels well. Pain is well controlled, tolerating regular diet, passing flatus, voiding spontaneously, ambulating without difficulty. Denies heavy vaginal bleeding, CP/SOB, lightheadedness/dizziness, N/V.    O:  Vitals:   Vital Signs Last 24 Hrs  T(C): 36.8 (2024 02:00), Max: 36.8 (2024 19:30)  T(F): 98.2 (2024 02:00), Max: 98.2 (2024 19:30)  HR: 77 (2024 02:00) (66 - 100)  BP: 121/78 (2024 02:00) (103/60 - 132/78)  BP(mean): --  RR: 16 (2024 02:00) (16 - 18)  SpO2: 100% (2024 02:00) (91% - 100%)    Parameters below as of 2024 19:30  Patient On (Oxygen Delivery Method): room air        MEDICATIONS  (STANDING):  acetaminophen     Tablet .. 975 milliGRAM(s) Oral <User Schedule>  diphtheria/tetanus/pertussis (acellular) Vaccine (Adacel) 0.5 milliLiter(s) IntraMuscular once  ibuprofen  Tablet. 600 milliGRAM(s) Oral every 6 hours  labetalol 200 milliGRAM(s) Oral three times a day  oxytocin Infusion 167 milliUNIT(s)/Min (167 mL/Hr) IV Continuous <Continuous>  oxytocin Infusion. 2 milliUNIT(s)/Min (2 mL/Hr) IV Continuous <Continuous>  prenatal multivitamin 1 Tablet(s) Oral daily  prenatal multivitamin 1 Tablet(s) Oral daily  sodium chloride 0.9% lock flush 3 milliLiter(s) IV Push every 8 hours  sodium chloride 0.9% lock flush 3 milliLiter(s) IV Push every 8 hours      MEDICATIONS  (PRN):  benzocaine 20%/menthol 0.5% Spray 1 Spray(s) Topical every 6 hours PRN for Perineal discomfort  dibucaine 1% Ointment 1 Application(s) Topical every 6 hours PRN Perineal discomfort  diphenhydrAMINE 25 milliGRAM(s) Oral every 6 hours PRN Pruritus  hydrocortisone 1% Cream 1 Application(s) Topical every 6 hours PRN Moderate Pain (4-6)  lanolin Ointment 1 Application(s) Topical every 6 hours PRN nipple soreness  magnesium hydroxide Suspension 30 milliLiter(s) Oral two times a day PRN Constipation  oxyCODONE    IR 5 milliGRAM(s) Oral every 3 hours PRN Moderate to Severe Pain (4-10)  oxyCODONE    IR 5 milliGRAM(s) Oral once PRN Moderate to Severe Pain (4-10)  pramoxine 1%/zinc 5% Cream 1 Application(s) Topical every 4 hours PRN Moderate Pain (4-6)  simethicone 80 milliGRAM(s) Chew every 4 hours PRN Gas  witch hazel Pads 1 Application(s) Topical every 4 hours PRN Perineal discomfort        Labs:  Blood type: A Negative  Rubella IgG: RPR: Negative                          9.2[L]   12.55[H] >-----------< 232    (  @ 14:45 )             29.3[L]                        10.4[L]   11.63[H] >-----------< 211    (  @ 03:06 )             33.3[L]                        11.0[L]   10.06 >-----------< 230    (  @ 10:50 )             36.0    24 @ 14:45      138  |  104  |  10  ----------------------------<  90  4.2   |  22  |  0.68    24 @ 05:33      134[L]  |  101  |  8   ----------------------------<  86  3.7   |  21[L]  |  0.60    24 @ 10:50      137  |  103  |  10  ----------------------------<  85  3.8   |  21[L]  |  0.59        Ca    7.2[L]      2024 14:45  Ca    7.7[L]      2024 05:33  Ca    9.2      2024 10:50  Mg     4.70[H]     11-27  Mg     5.00[H]     11-27  Mg     5.40[H]     11-27  Mg     5.30[H]     11-27  Mg     6.00[H]     11-26  Mg     5.80[H]     11-26  Mg     5.80[H]     11-26  Mg     4.70[H]     11-26  Mg     4.20[H]     25    TPro  6.2  /  Alb  3.2[L]  /  TBili  <0.2  /  DBili  x   /  AST  27  /  ALT  18  /  AlkPhos  101  24 @ 14:45  TPro  6.4  /  Alb  3.4  /  TBili  0.2  /  DBili  x   /  AST  17  /  ALT  14  /  AlkPhos  117  24 @ 05:33  TPro  6.7  /  Alb  3.7  /  TBili  <0.2  /  DBili  x   /  AST  18  /  ALT  14  /  AlkPhos  121[H]  24 @ 10:50          Physical Exam:  General: NAD  Abdomen: soft, non-tender, non-distended, fundus firm  Vaginal: No heavy vaginal bleeding  Extremities: No erythema/edema  
28yo  PPD#1 s/p  course c/b siPEC on magnesium. Patient feels well. Pain is well controlled, tolerating regular diet, passing flatus, +BM, voiding spontaneously. Ambulated minimally since delivery. Denies heavy vaginal bleeding, CP/SOB, N/V, lightheadedness/dizziness.     O:  Vitals:  Vital Signs Last 24 Hrs  T(C): 37.1 (2024 21:56), Max: 37.1 (2024 21:56)  T(F): 98.78 (2024 21:56), Max: 98.78 (2024 21:56)  HR: 78 (2024 03:25) (65 - 103)  BP: 95/58 (2024 03:25) (88/51 - 161/91)  BP(mean): --  RR: 17 (2024 21:56) (16 - 18)  SpO2: 97% (2024 03:12) (84% - 100%)        MEDICATIONS  (STANDING):  acetaminophen     Tablet .. 975 milliGRAM(s) Oral <User Schedule>  chlorhexidine 2% Cloths 1 Application(s) Topical daily  diphtheria/tetanus/pertussis (acellular) Vaccine (Adacel) 0.5 milliLiter(s) IntraMuscular once  ibuprofen  Tablet. 600 milliGRAM(s) Oral every 6 hours  ketorolac   Injectable 30 milliGRAM(s) IV Push once  labetalol 200 milliGRAM(s) Oral three times a day  lactated ringers. 1000 milliLiter(s) (50 mL/Hr) IV Continuous <Continuous>  magnesium sulfate Infusion 2 Gm/Hr (50 mL/Hr) IV Continuous <Continuous>  oxytocin Infusion 167 milliUNIT(s)/Min (167 mL/Hr) IV Continuous <Continuous>  oxytocin Infusion. 2 milliUNIT(s)/Min (2 mL/Hr) IV Continuous <Continuous>  prenatal multivitamin 1 Tablet(s) Oral daily  prenatal multivitamin 1 Tablet(s) Oral daily  sodium chloride 0.9% lock flush 3 milliLiter(s) IV Push every 8 hours  sodium chloride 0.9% lock flush 3 milliLiter(s) IV Push every 8 hours      MEDICATIONS  (PRN):  benzocaine 20%/menthol 0.5% Spray 1 Spray(s) Topical every 6 hours PRN for Perineal discomfort  dibucaine 1% Ointment 1 Application(s) Topical every 6 hours PRN Perineal discomfort  diphenhydrAMINE 25 milliGRAM(s) Oral every 6 hours PRN Pruritus  hydrocortisone 1% Cream 1 Application(s) Topical every 6 hours PRN Moderate Pain (4-6)  lanolin Ointment 1 Application(s) Topical every 6 hours PRN nipple soreness  magnesium hydroxide Suspension 30 milliLiter(s) Oral two times a day PRN Constipation  oxyCODONE    IR 5 milliGRAM(s) Oral every 3 hours PRN Moderate to Severe Pain (4-10)  oxyCODONE    IR 5 milliGRAM(s) Oral once PRN Moderate to Severe Pain (4-10)  pramoxine 1%/zinc 5% Cream 1 Application(s) Topical every 4 hours PRN Moderate Pain (4-6)  simethicone 80 milliGRAM(s) Chew every 4 hours PRN Gas  witch hazel Pads 1 Application(s) Topical every 4 hours PRN Perineal discomfort      Labs:  Blood type: A Negative  Rubella IgG: RPR: Negative                          10.4[L]   11.63[H] >-----------< 211    (  @ 03:06 )             33.3[L]                        11.0[L]   10.06 >-----------< 230    (  @ 10:50 )             36.0    -24 @ 05:33      134[L]  |  101  |  8   ----------------------------<  86  3.7   |  21[L]  |  0.60    -24 @ 10:50      137  |  103  |  10  ----------------------------<  85  3.8   |  21[L]  |  0.59        Ca    7.7[L]      2024 05:33  Ca    9.2      2024 10:50  Mg     5.30[H]     11-27  Mg     6.00[H]     11-26  Mg     5.80[H]     11-26  Mg     5.80[H]     11-26  Mg     4.70[H]       Mg     4.20[H]         TPro  6.4  /  Alb  3.4  /  TBili  0.2  /  DBili  x   /  AST  17  /  ALT  14  /  AlkPhos  117  24 @ 05:33  TPro  6.7  /  Alb  3.7  /  TBili  <0.2  /  DBili  x   /  AST  18  /  ALT  14  /  AlkPhos  121[H]  24 @ 10:50          Physical Exam:  General: NAD  Abdomen: soft, non-tender, non-distended, fundus firm  Vaginal: No heavy vaginal bleeding  Extremities: No erythema/edema

## 2024-11-28 NOTE — PROGRESS NOTE ADULT - ASSESSMENT
30yo PPD#1 s/p .  Patient is stable and doing well post-partum.     #siPEC  -cw BP monitoring, BP: 95/58 (11--24 @ 03:25) (88/51 - 161/91)  -HELLP wnl, P/C: 0.1  -Pt on Mg x24 hrs postpartum and Labetalol 200 TID  -No PEC symptoms    #Postpartum   - Pain well controlled, continue current pain regimen  - Increase ambulation, SCDs when not ambulating  - Continue regular diet    Katelyn Pierson, PGY-1
28yo PPD#2 s/p  c/b siPEC.  Patient is stable and doing well post-partum.     #SiPEC  -cw BP monitoring, BP: 121/78 (24 @ 02:00) (121/78 - 132/78)  -HELLP wnl, P/C: 0.1  -Pt on Labetalol 200TID  -No PEC symptoms     #Postpartum  - Pain well controlled, continue current pain regimen  - Increase ambulation, SCDs when not ambulating  - Continue regular diet  - Discharge planning     Katelyn Pierson, PGY-1

## 2024-11-29 ENCOUNTER — APPOINTMENT (OUTPATIENT)
Dept: ANTEPARTUM | Facility: CLINIC | Age: 29
End: 2024-11-29

## 2024-11-29 VITALS
DIASTOLIC BLOOD PRESSURE: 49 MMHG | TEMPERATURE: 99 F | SYSTOLIC BLOOD PRESSURE: 103 MMHG | RESPIRATION RATE: 19 BRPM | HEART RATE: 69 BPM | OXYGEN SATURATION: 98 %

## 2024-12-10 ENCOUNTER — NON-APPOINTMENT (OUTPATIENT)
Age: 29
End: 2024-12-10

## 2024-12-13 ENCOUNTER — NON-APPOINTMENT (OUTPATIENT)
Age: 29
End: 2024-12-13

## 2024-12-16 ENCOUNTER — NON-APPOINTMENT (OUTPATIENT)
Age: 29
End: 2024-12-16

## 2024-12-16 ENCOUNTER — APPOINTMENT (OUTPATIENT)
Dept: CARDIOLOGY | Facility: CLINIC | Age: 29
End: 2024-12-16
Payer: MEDICAID

## 2024-12-16 VITALS
BODY MASS INDEX: 31.86 KG/M2 | WEIGHT: 203 LBS | HEART RATE: 77 BPM | HEIGHT: 67 IN | DIASTOLIC BLOOD PRESSURE: 77 MMHG | SYSTOLIC BLOOD PRESSURE: 112 MMHG | OXYGEN SATURATION: 98 %

## 2024-12-16 PROCEDURE — 99214 OFFICE O/P EST MOD 30 MIN: CPT | Mod: 25

## 2024-12-16 PROCEDURE — 93000 ELECTROCARDIOGRAM COMPLETE: CPT

## 2025-01-02 ENCOUNTER — APPOINTMENT (OUTPATIENT)
Dept: OBGYN | Facility: CLINIC | Age: 30
End: 2025-01-02

## 2025-01-02 VITALS
HEART RATE: 86 BPM | SYSTOLIC BLOOD PRESSURE: 119 MMHG | DIASTOLIC BLOOD PRESSURE: 82 MMHG | BODY MASS INDEX: 28.25 KG/M2 | HEIGHT: 67 IN | WEIGHT: 180 LBS

## 2025-01-06 ENCOUNTER — NON-APPOINTMENT (OUTPATIENT)
Age: 30
End: 2025-01-06

## 2025-01-07 RX ORDER — BLOOD-GLUCOSE METER
KIT MISCELLANEOUS
Qty: 1 | Refills: 0 | Status: ACTIVE | COMMUNITY
Start: 2025-01-07 | End: 1900-01-01

## 2025-02-13 ENCOUNTER — NON-APPOINTMENT (OUTPATIENT)
Age: 30
End: 2025-02-13

## 2025-05-21 ENCOUNTER — NON-APPOINTMENT (OUTPATIENT)
Age: 30
End: 2025-05-21

## 2025-05-21 ENCOUNTER — APPOINTMENT (OUTPATIENT)
Dept: OBGYN | Facility: CLINIC | Age: 30
End: 2025-05-21
Payer: MEDICAID

## 2025-05-21 VITALS
WEIGHT: 180 LBS | SYSTOLIC BLOOD PRESSURE: 136 MMHG | HEART RATE: 84 BPM | BODY MASS INDEX: 28.25 KG/M2 | HEIGHT: 67 IN | DIASTOLIC BLOOD PRESSURE: 86 MMHG

## 2025-05-21 DIAGNOSIS — Z01.411 ENCOUNTER FOR GYNECOLOGICAL EXAMINATION (GENERAL) (ROUTINE) WITH ABNORMAL FINDINGS: ICD-10-CM

## 2025-05-21 DIAGNOSIS — Z30.011 ENCOUNTER FOR INITIAL PRESCRIPTION OF CONTRACEPTIVE PILLS: ICD-10-CM

## 2025-05-21 DIAGNOSIS — Z01.419 ENCOUNTER FOR GYNECOLOGICAL EXAMINATION (GENERAL) (ROUTINE) W/OUT ABNORMAL FINDINGS: ICD-10-CM

## 2025-05-21 PROCEDURE — 99395 PREV VISIT EST AGE 18-39: CPT | Mod: 25

## 2025-05-21 PROCEDURE — 99459 PELVIC EXAMINATION: CPT

## 2025-05-21 PROCEDURE — G0444 DEPRESSION SCREEN ANNUAL: CPT | Mod: 59

## 2025-05-21 RX ORDER — NORETHINDRONE ACETATE AND ETHINYL ESTRADIOL 1; 20 MG/1; UG/1
1-20 TABLET ORAL DAILY
Qty: 84 | Refills: 3 | Status: ACTIVE | COMMUNITY
Start: 2025-05-21 | End: 1900-01-01

## 2025-05-23 LAB — HPV HIGH+LOW RISK DNA PNL CVX: NOT DETECTED

## 2025-05-27 LAB — CYTOLOGY CVX/VAG DOC THIN PREP: NORMAL

## 2025-06-19 NOTE — OB RN PATIENT PROFILE - AS SC BRADEN FRICTION
Medication passed protocol.     Medication: sertraline (ZOLOFT) 100 MG tablet  passed protocol.   Last office visit date: 5/19/25  Next appointment scheduled?: Yes    (3) no apparent problem

## 2025-08-13 ENCOUNTER — APPOINTMENT (OUTPATIENT)
Dept: OBGYN | Facility: CLINIC | Age: 30
End: 2025-08-13